# Patient Record
Sex: FEMALE | Race: BLACK OR AFRICAN AMERICAN | NOT HISPANIC OR LATINO
[De-identification: names, ages, dates, MRNs, and addresses within clinical notes are randomized per-mention and may not be internally consistent; named-entity substitution may affect disease eponyms.]

---

## 2017-01-11 ENCOUNTER — RECORD ABSTRACTING (OUTPATIENT)
Age: 52
End: 2017-01-11

## 2017-01-11 DIAGNOSIS — Z78.9 OTHER SPECIFIED HEALTH STATUS: ICD-10-CM

## 2017-01-11 DIAGNOSIS — Z86.018 PERSONAL HISTORY OF OTHER BENIGN NEOPLASM: ICD-10-CM

## 2017-01-11 DIAGNOSIS — Z82.3 FAMILY HISTORY OF STROKE: ICD-10-CM

## 2017-01-11 DIAGNOSIS — Z86.2 PERSONAL HISTORY OF DISEASES OF THE BLOOD AND BLOOD-FORMING ORGANS AND CERTAIN DISORDERS INVOLVING THE IMMUNE MECHANISM: ICD-10-CM

## 2017-01-26 ENCOUNTER — APPOINTMENT (OUTPATIENT)
Dept: OBGYN | Facility: CLINIC | Age: 52
End: 2017-01-26

## 2017-02-24 ENCOUNTER — RECORD ABSTRACTING (OUTPATIENT)
Age: 52
End: 2017-02-24

## 2017-02-24 DIAGNOSIS — Z98.891 HISTORY OF UTERINE SCAR FROM PREVIOUS SURGERY: ICD-10-CM

## 2017-02-24 DIAGNOSIS — Z87.59 PERSONAL HISTORY OF OTHER COMPLICATIONS OF PREGNANCY, CHILDBIRTH AND THE PUERPERIUM: ICD-10-CM

## 2017-02-24 DIAGNOSIS — Z33.2 ENCOUNTER FOR ELECTIVE TERMINATION OF PREGNANCY: ICD-10-CM

## 2022-08-10 ENCOUNTER — APPOINTMENT (OUTPATIENT)
Dept: OPHTHALMOLOGY | Facility: CLINIC | Age: 57
End: 2022-08-10

## 2022-08-10 ENCOUNTER — OUTPATIENT (OUTPATIENT)
Dept: OUTPATIENT SERVICES | Facility: HOSPITAL | Age: 57
LOS: 1 days | Discharge: HOME | End: 2022-08-10

## 2022-08-10 PROCEDURE — 92134 CPTRZ OPH DX IMG PST SGM RTA: CPT | Mod: 26

## 2022-08-10 PROCEDURE — 92004 COMPRE OPH EXAM NEW PT 1/>: CPT

## 2022-08-11 DIAGNOSIS — H25.813 COMBINED FORMS OF AGE-RELATED CATARACT, BILATERAL: ICD-10-CM

## 2022-08-11 DIAGNOSIS — E11.3313 TYPE 2 DIABETES MELLITUS WITH MODERATE NONPROLIFERATIVE DIABETIC RETINOPATHY WITH MACULAR EDEMA, BILATERAL: ICD-10-CM

## 2022-08-30 ENCOUNTER — OUTPATIENT (OUTPATIENT)
Dept: OUTPATIENT SERVICES | Facility: HOSPITAL | Age: 57
LOS: 1 days | Discharge: HOME | End: 2022-08-30

## 2022-08-30 ENCOUNTER — APPOINTMENT (OUTPATIENT)
Dept: OPHTHALMOLOGY | Facility: CLINIC | Age: 57
End: 2022-08-30

## 2022-08-30 PROCEDURE — 92134 CPTRZ OPH DX IMG PST SGM RTA: CPT | Mod: 26

## 2022-08-30 PROCEDURE — 92014 COMPRE OPH EXAM EST PT 1/>: CPT

## 2022-10-11 ENCOUNTER — OUTPATIENT (OUTPATIENT)
Dept: OUTPATIENT SERVICES | Facility: HOSPITAL | Age: 57
LOS: 1 days | Discharge: HOME | End: 2022-10-11

## 2022-10-11 ENCOUNTER — APPOINTMENT (OUTPATIENT)
Dept: OPHTHALMOLOGY | Facility: CLINIC | Age: 57
End: 2022-10-11

## 2022-10-11 PROCEDURE — 92136 OPHTHALMIC BIOMETRY: CPT | Mod: 26

## 2022-10-12 ENCOUNTER — APPOINTMENT (OUTPATIENT)
Dept: ENDOCRINOLOGY | Facility: CLINIC | Age: 57
End: 2022-10-12

## 2022-10-12 ENCOUNTER — OUTPATIENT (OUTPATIENT)
Dept: OUTPATIENT SERVICES | Facility: HOSPITAL | Age: 57
LOS: 1 days | Discharge: HOME | End: 2022-10-12

## 2022-10-12 VITALS
HEART RATE: 72 BPM | BODY MASS INDEX: 38.38 KG/M2 | SYSTOLIC BLOOD PRESSURE: 150 MMHG | HEIGHT: 66 IN | WEIGHT: 238.8 LBS | DIASTOLIC BLOOD PRESSURE: 77 MMHG

## 2022-10-12 PROCEDURE — 99203 OFFICE O/P NEW LOW 30 MIN: CPT | Mod: GC

## 2022-10-12 NOTE — ASSESSMENT
[Diabetes Foot Care] : diabetes foot care [Carbohydrate Consistent Diet] : carbohydrate consistent diet [Importance of Diet and Exercise] : importance of diet and exercise to improve glycemic control, achieve weight loss and improve cardiovascular health [Exercise/Effect on Glucose] : exercise/effect on glucose [Weight Loss] : weight loss [FreeTextEntry1] : 58 yo F with PMHx DM referred from opthalmology  office today for DM screening. Pt reports she has been diagnosed with DM 13 years ago and was taking metformin until the start of covid pandemic but have not been taking any meds since then. Denies any new complaints\par \par #DM\par - Blood work: F/u a1c, CMP, lipid profile, CBC, TSH, b12 in next visit\par \par - scheduled for cataract surgery on 11/14 and need clearance

## 2022-10-12 NOTE — HISTORY OF PRESENT ILLNESS
[FreeTextEntry1] : 56 yo F with PMHx DM referred from ophthalmology  office today for DM screening. Pt reports she has been diagnosed with DM 13 years ago and was taking metformin until the start of covid pandemic but have not been taking any meds since then. Denies any new complaints

## 2022-10-12 NOTE — END OF VISIT
[] : Resident [FreeTextEntry3] : 57 year old female with known type 2 DM , on on metformin until 2-3 years ago when she stopped completely ( COVID pandemic ) . was seen at eye clinic and need to have cataract surgery done. \par no recent blood work done and need to do labs now and based on results will restart metformin or add another meds \par discussed diet and exercise  [Time Spent: ___ minutes] : I have spent [unfilled] minutes of time on the encounter.

## 2022-10-12 NOTE — PHYSICAL EXAM
[Alert] : alert [Well Nourished] : well nourished [No Acute Distress] : no acute distress [Well Developed] : well developed [No Proptosis] : no proptosis [Normal Oropharynx] : the oropharynx was normal [Thyroid Not Enlarged] : the thyroid was not enlarged [No Thyroid Nodules] : no palpable thyroid nodules [No Respiratory Distress] : no respiratory distress [No Accessory Muscle Use] : no accessory muscle use [Clear to Auscultation] : lungs were clear to auscultation bilaterally [Normal S1, S2] : normal S1 and S2 [Normal Rate] : heart rate was normal [Regular Rhythm] : with a regular rhythm [No Edema] : no peripheral edema [Pedal Pulses Normal] : the pedal pulses are present [Normal Bowel Sounds] : normal bowel sounds [Not Tender] : non-tender [Not Distended] : not distended [Soft] : abdomen soft [Normal Anterior Cervical Nodes] : no anterior cervical lymphadenopathy [Normal Posterior Cervical Nodes] : no posterior cervical lymphadenopathy [No Spinal Tenderness] : no spinal tenderness [Spine Straight] : spine straight [No Stigmata of Cushings Syndrome] : no stigmata of Cushings Syndrome [Normal Gait] : normal gait [Normal Strength/Tone] : muscle strength and tone were normal [No Tremors] : no tremors [Oriented x3] : oriented to person, place, and time [Obese] : obese [No Lid Lag] : no lid lag [Acanthosis Nigricans] : no acanthosis nigricans

## 2022-10-12 NOTE — REASON FOR VISIT
[Initial Evaluation] : an initial evaluation [DM Type 2] : DM Type 2 [FreeTextEntry2] : opthalmology

## 2022-10-13 ENCOUNTER — OUTPATIENT (OUTPATIENT)
Dept: OUTPATIENT SERVICES | Facility: HOSPITAL | Age: 57
LOS: 1 days | Discharge: HOME | End: 2022-10-13

## 2022-10-13 ENCOUNTER — APPOINTMENT (OUTPATIENT)
Dept: INTERNAL MEDICINE | Facility: CLINIC | Age: 57
End: 2022-10-13

## 2022-10-13 VITALS
BODY MASS INDEX: 38.09 KG/M2 | OXYGEN SATURATION: 99 % | SYSTOLIC BLOOD PRESSURE: 163 MMHG | WEIGHT: 237 LBS | HEIGHT: 66 IN | HEART RATE: 66 BPM | DIASTOLIC BLOOD PRESSURE: 96 MMHG | TEMPERATURE: 88.1 F

## 2022-10-13 PROCEDURE — 99203 OFFICE O/P NEW LOW 30 MIN: CPT | Mod: GC

## 2022-10-13 NOTE — ASSESSMENT
[FreeTextEntry1] : 57F with PMHx of HTN, DM, Obesity presents for initial evaluation. \par \par Diabetes;\par Low sugar, low carbohydrate diet \par Exercise Counseled \par Patient given opportunity to discuss frequency and target blood sugar levels\par Patient educated on symptoms of hypo/hyperglycemic events \par Counseled on: Yearly Ophthalmology and Podiatry Exam\par seeing endo\par f/u baseline labs\par \par HTN;\par DASH diet discussed and recommended\par Exercise and weight loss counseled \par Frequency and target at home BP readings discussed\par Treatment options and possible side effects discussed\par Patient counseled on symptoms of hypo/hypertension\par Counseled: Yearly Ophthalmology exams\par -Pt will obtain ambulatory BP readings and bring them in next visit before starting medications\par -BP in clinic 150/77, next clinic visit 163/96\par -F/u routine labs then start ACEI/ARB if BMP okay\par \par Obesity;\par Diet/Exercise Counseled\par Patient was counseled on changing their diet to low fat, low carbohydrates and low cholesterol.\par Patient was also counseled on increasing their activity to 45 minutes of exercise daily.\par \par #HCM\par - Covid vaccinated x2 , considering booster at clinic\par - Never had colonoscopy -> Sent GI referral\par - Mammogram script sent, never had mammogram\par - Last PAP smear 2016, sent GYN referral\par - Routine blood work pending\par - RTC in 3 months or PRN\par \par Seen by resident Nafisa on 10/13/2022 as well

## 2022-10-13 NOTE — REVIEW OF SYSTEMS
[Vision Problems] : vision problems [Fever] : no fever [Chills] : no chills [Nasal Discharge] : no nasal discharge [Sore Throat] : no sore throat [Chest Pain] : no chest pain [Palpitations] : no palpitations [Lower Ext Edema] : no lower extremity edema [Orthopnea] : no orthopnea [Shortness Of Breath] : no shortness of breath [Wheezing] : no wheezing [Cough] : no cough [Dyspnea on Exertion] : no dyspnea on exertion [Abdominal Pain] : no abdominal pain [Nausea] : no nausea [Constipation] : no constipation [Diarrhea] : diarrhea [Vomiting] : no vomiting [Heartburn] : no heartburn [Joint Pain] : no joint pain [Joint Stiffness] : no joint stiffness [Muscle Pain] : no muscle pain [Skin Rash] : no skin rash [Headache] : no headache [Insomnia] : no insomnia

## 2022-10-13 NOTE — PHYSICAL EXAM
[No Acute Distress] : no acute distress [Well Nourished] : well nourished [Well Developed] : well developed [EOMI] : extraocular movements intact [Normal Oropharynx] : the oropharynx was normal [Supple] : supple [No Respiratory Distress] : no respiratory distress  [No Accessory Muscle Use] : no accessory muscle use [Clear to Auscultation] : lungs were clear to auscultation bilaterally [Normal Rate] : normal rate  [Regular Rhythm] : with a regular rhythm [Normal S1, S2] : normal S1 and S2 [No Murmur] : no murmur heard [No Edema] : there was no peripheral edema [Soft] : abdomen soft [Non Tender] : non-tender [Non-distended] : non-distended [No Spinal Tenderness] : no spinal tenderness [Grossly Normal Strength/Tone] : grossly normal strength/tone [Normal Gait] : normal gait [Normal Affect] : the affect was normal

## 2022-10-13 NOTE — HISTORY OF PRESENT ILLNESS
[FreeTextEntry1] : Establish care - no complaints [de-identified] : 57F with PMHx of HTN, DM, and obesity presents to the clinic to establish care. Patient has no complaints today. She has not seen a doctor in a few years since before the start of the covid pandemic. She saw endocrine yesterday and had routine blood work ordered, pending today. Has an upcoming cataract surgery, here for clearance too. \par \par Never had colonoscopy. Covid vaccinated x2. Last PAP 2016, no mammo.

## 2022-10-14 LAB
ALBUMIN SERPL ELPH-MCNC: 4 G/DL
ALP BLD-CCNC: 152 U/L
ALT SERPL-CCNC: 11 U/L
ANION GAP SERPL CALC-SCNC: 11 MMOL/L
AST SERPL-CCNC: 11 U/L
BASOPHILS # BLD AUTO: 0.03 K/UL
BASOPHILS NFR BLD AUTO: 0.5 %
BILIRUB SERPL-MCNC: 0.7 MG/DL
BUN SERPL-MCNC: 15 MG/DL
CALCIUM SERPL-MCNC: 9.1 MG/DL
CHLORIDE SERPL-SCNC: 103 MMOL/L
CHOLEST SERPL-MCNC: 208 MG/DL
CO2 SERPL-SCNC: 27 MMOL/L
CREAT SERPL-MCNC: 1 MG/DL
CREAT SPEC-SCNC: 79 MG/DL
EGFR: 67 ML/MIN/1.73M2
EOSINOPHIL # BLD AUTO: 0.27 K/UL
EOSINOPHIL NFR BLD AUTO: 4.9 %
ESTIMATED AVERAGE GLUCOSE: 292 MG/DL
GLUCOSE SERPL-MCNC: 249 MG/DL
HBA1C MFR BLD HPLC: 11.8 %
HCT VFR BLD CALC: 35.3 %
HDLC SERPL-MCNC: 44 MG/DL
HGB BLD-MCNC: 11.3 G/DL
IMM GRANULOCYTES NFR BLD AUTO: 0.2 %
LDLC SERPL CALC-MCNC: 147 MG/DL
LYMPHOCYTES # BLD AUTO: 2.14 K/UL
LYMPHOCYTES NFR BLD AUTO: 39.2 %
MAN DIFF?: NORMAL
MCHC RBC-ENTMCNC: 26.2 PG
MCHC RBC-ENTMCNC: 32 G/DL
MCV RBC AUTO: 81.9 FL
MICROALBUMIN 24H UR DL<=1MG/L-MCNC: 3 MG/DL
MICROALBUMIN/CREAT 24H UR-RTO: 38 MG/G
MONOCYTES # BLD AUTO: 0.55 K/UL
MONOCYTES NFR BLD AUTO: 10.1 %
NEUTROPHILS # BLD AUTO: 2.46 K/UL
NEUTROPHILS NFR BLD AUTO: 45.1 %
NONHDLC SERPL-MCNC: 164 MG/DL
PLATELET # BLD AUTO: 261 K/UL
POTASSIUM SERPL-SCNC: 4.6 MMOL/L
PROT SERPL-MCNC: 7.3 G/DL
RBC # BLD: 4.31 M/UL
RBC # FLD: 14.2 %
SODIUM SERPL-SCNC: 141 MMOL/L
TRIGL SERPL-MCNC: 85 MG/DL
TSH SERPL-ACNC: 2.08 UIU/ML
VIT B12 SERPL-MCNC: 734 PG/ML
WBC # FLD AUTO: 5.46 K/UL

## 2022-10-17 DIAGNOSIS — I10 ESSENTIAL (PRIMARY) HYPERTENSION: ICD-10-CM

## 2022-10-17 DIAGNOSIS — Z00.00 ENCOUNTER FOR GENERAL ADULT MEDICAL EXAMINATION WITHOUT ABNORMAL FINDINGS: ICD-10-CM

## 2022-10-17 DIAGNOSIS — E11.9 TYPE 2 DIABETES MELLITUS WITHOUT COMPLICATIONS: ICD-10-CM

## 2022-10-17 DIAGNOSIS — E66.9 OBESITY, UNSPECIFIED: ICD-10-CM

## 2022-11-02 ENCOUNTER — INPATIENT (INPATIENT)
Facility: HOSPITAL | Age: 57
LOS: 0 days | Discharge: HOME | End: 2022-11-03
Attending: INTERNAL MEDICINE | Admitting: INTERNAL MEDICINE

## 2022-11-02 VITALS
HEART RATE: 78 BPM | TEMPERATURE: 99 F | SYSTOLIC BLOOD PRESSURE: 214 MMHG | RESPIRATION RATE: 18 BRPM | OXYGEN SATURATION: 99 % | DIASTOLIC BLOOD PRESSURE: 101 MMHG

## 2022-11-02 LAB
ALBUMIN SERPL ELPH-MCNC: 4.1 G/DL — SIGNIFICANT CHANGE UP (ref 3.5–5.2)
ALBUMIN SERPL ELPH-MCNC: 4.3 G/DL — SIGNIFICANT CHANGE UP (ref 3.5–5.2)
ALP SERPL-CCNC: 114 U/L — SIGNIFICANT CHANGE UP (ref 30–115)
ALP SERPL-CCNC: 134 U/L — HIGH (ref 30–115)
ALT FLD-CCNC: 13 U/L — SIGNIFICANT CHANGE UP (ref 0–41)
ALT FLD-CCNC: 14 U/L — SIGNIFICANT CHANGE UP (ref 0–41)
ANION GAP SERPL CALC-SCNC: 12 MMOL/L — SIGNIFICANT CHANGE UP (ref 7–14)
ANION GAP SERPL CALC-SCNC: 14 MMOL/L — SIGNIFICANT CHANGE UP (ref 7–14)
APPEARANCE UR: CLEAR — SIGNIFICANT CHANGE UP
AST SERPL-CCNC: 24 U/L — SIGNIFICANT CHANGE UP (ref 0–41)
AST SERPL-CCNC: 42 U/L — HIGH (ref 0–41)
BASOPHILS # BLD AUTO: 0.03 K/UL — SIGNIFICANT CHANGE UP (ref 0–0.2)
BASOPHILS NFR BLD AUTO: 0.4 % — SIGNIFICANT CHANGE UP (ref 0–1)
BILIRUB SERPL-MCNC: 0.6 MG/DL — SIGNIFICANT CHANGE UP (ref 0.2–1.2)
BILIRUB SERPL-MCNC: 0.6 MG/DL — SIGNIFICANT CHANGE UP (ref 0.2–1.2)
BILIRUB UR-MCNC: NEGATIVE — SIGNIFICANT CHANGE UP
BUN SERPL-MCNC: 14 MG/DL — SIGNIFICANT CHANGE UP (ref 10–20)
BUN SERPL-MCNC: 14 MG/DL — SIGNIFICANT CHANGE UP (ref 10–20)
CALCIUM SERPL-MCNC: 8.9 MG/DL — SIGNIFICANT CHANGE UP (ref 8.4–10.4)
CALCIUM SERPL-MCNC: 9 MG/DL — SIGNIFICANT CHANGE UP (ref 8.4–10.5)
CHLORIDE SERPL-SCNC: 100 MMOL/L — SIGNIFICANT CHANGE UP (ref 98–110)
CHLORIDE SERPL-SCNC: 99 MMOL/L — SIGNIFICANT CHANGE UP (ref 98–110)
CO2 SERPL-SCNC: 22 MMOL/L — SIGNIFICANT CHANGE UP (ref 17–32)
CO2 SERPL-SCNC: 24 MMOL/L — SIGNIFICANT CHANGE UP (ref 17–32)
COLOR SPEC: SIGNIFICANT CHANGE UP
CREAT SERPL-MCNC: 1.1 MG/DL — SIGNIFICANT CHANGE UP (ref 0.7–1.5)
CREAT SERPL-MCNC: 1.1 MG/DL — SIGNIFICANT CHANGE UP (ref 0.7–1.5)
DIFF PNL FLD: NEGATIVE — SIGNIFICANT CHANGE UP
EGFR: 59 ML/MIN/1.73M2 — LOW
EGFR: 59 ML/MIN/1.73M2 — LOW
EOSINOPHIL # BLD AUTO: 0.04 K/UL — SIGNIFICANT CHANGE UP (ref 0–0.7)
EOSINOPHIL NFR BLD AUTO: 0.5 % — SIGNIFICANT CHANGE UP (ref 0–8)
GLUCOSE SERPL-MCNC: 173 MG/DL — HIGH (ref 70–99)
GLUCOSE SERPL-MCNC: 265 MG/DL — HIGH (ref 70–99)
GLUCOSE UR QL: ABNORMAL
HCT VFR BLD CALC: 33.2 % — LOW (ref 37–47)
HGB BLD-MCNC: 11 G/DL — LOW (ref 12–16)
IMM GRANULOCYTES NFR BLD AUTO: 0.3 % — SIGNIFICANT CHANGE UP (ref 0.1–0.3)
KETONES UR-MCNC: SIGNIFICANT CHANGE UP
LEUKOCYTE ESTERASE UR-ACNC: ABNORMAL
LYMPHOCYTES # BLD AUTO: 1.53 K/UL — SIGNIFICANT CHANGE UP (ref 1.2–3.4)
LYMPHOCYTES # BLD AUTO: 20 % — LOW (ref 20.5–51.1)
MCHC RBC-ENTMCNC: 26.6 PG — LOW (ref 27–31)
MCHC RBC-ENTMCNC: 33.1 G/DL — SIGNIFICANT CHANGE UP (ref 32–37)
MCV RBC AUTO: 80.4 FL — LOW (ref 81–99)
MONOCYTES # BLD AUTO: 0.48 K/UL — SIGNIFICANT CHANGE UP (ref 0.1–0.6)
MONOCYTES NFR BLD AUTO: 6.3 % — SIGNIFICANT CHANGE UP (ref 1.7–9.3)
NEUTROPHILS # BLD AUTO: 5.56 K/UL — SIGNIFICANT CHANGE UP (ref 1.4–6.5)
NEUTROPHILS NFR BLD AUTO: 72.5 % — SIGNIFICANT CHANGE UP (ref 42.2–75.2)
NITRITE UR-MCNC: NEGATIVE — SIGNIFICANT CHANGE UP
NRBC # BLD: 0 /100 WBCS — SIGNIFICANT CHANGE UP (ref 0–0)
NT-PROBNP SERPL-SCNC: 39 PG/ML — SIGNIFICANT CHANGE UP (ref 0–300)
PH UR: 8 — SIGNIFICANT CHANGE UP (ref 5–8)
PLATELET # BLD AUTO: 294 K/UL — SIGNIFICANT CHANGE UP (ref 130–400)
POTASSIUM SERPL-MCNC: 5.5 MMOL/L — HIGH (ref 3.5–5)
POTASSIUM SERPL-MCNC: SIGNIFICANT CHANGE UP MMOL/L (ref 3.5–5)
POTASSIUM SERPL-SCNC: 5.5 MMOL/L — HIGH (ref 3.5–5)
POTASSIUM SERPL-SCNC: SIGNIFICANT CHANGE UP MMOL/L (ref 3.5–5)
PROT SERPL-MCNC: 7.9 G/DL — SIGNIFICANT CHANGE UP (ref 6–8)
PROT SERPL-MCNC: 7.9 G/DL — SIGNIFICANT CHANGE UP (ref 6–8)
PROT UR-MCNC: SIGNIFICANT CHANGE UP
RBC # BLD: 4.13 M/UL — LOW (ref 4.2–5.4)
RBC # FLD: 14.2 % — SIGNIFICANT CHANGE UP (ref 11.5–14.5)
SARS-COV-2 RNA SPEC QL NAA+PROBE: SIGNIFICANT CHANGE UP
SODIUM SERPL-SCNC: 134 MMOL/L — LOW (ref 135–146)
SODIUM SERPL-SCNC: 137 MMOL/L — SIGNIFICANT CHANGE UP (ref 135–146)
SP GR SPEC: 1.03 — HIGH (ref 1.01–1.03)
TROPONIN T SERPL-MCNC: <0.01 NG/ML — SIGNIFICANT CHANGE UP
UROBILINOGEN FLD QL: SIGNIFICANT CHANGE UP
WBC # BLD: 7.66 K/UL — SIGNIFICANT CHANGE UP (ref 4.8–10.8)
WBC # FLD AUTO: 7.66 K/UL — SIGNIFICANT CHANGE UP (ref 4.8–10.8)

## 2022-11-02 PROCEDURE — 70498 CT ANGIOGRAPHY NECK: CPT | Mod: 26,MA

## 2022-11-02 PROCEDURE — 70496 CT ANGIOGRAPHY HEAD: CPT | Mod: 26,MA

## 2022-11-02 PROCEDURE — 93010 ELECTROCARDIOGRAM REPORT: CPT

## 2022-11-02 PROCEDURE — 71045 X-RAY EXAM CHEST 1 VIEW: CPT | Mod: 26

## 2022-11-02 PROCEDURE — 99285 EMERGENCY DEPT VISIT HI MDM: CPT

## 2022-11-02 RX ORDER — ACETAMINOPHEN 500 MG
975 TABLET ORAL ONCE
Refills: 0 | Status: COMPLETED | OUTPATIENT
Start: 2022-11-02 | End: 2022-11-03

## 2022-11-02 RX ORDER — KETOROLAC TROMETHAMINE 30 MG/ML
30 SYRINGE (ML) INJECTION ONCE
Refills: 0 | Status: DISCONTINUED | OUTPATIENT
Start: 2022-11-02 | End: 2022-11-02

## 2022-11-02 RX ORDER — METOCLOPRAMIDE HCL 10 MG
10 TABLET ORAL ONCE
Refills: 0 | Status: COMPLETED | OUTPATIENT
Start: 2022-11-02 | End: 2022-11-02

## 2022-11-02 RX ADMIN — Medication 104 MILLIGRAM(S): at 20:00

## 2022-11-02 RX ADMIN — Medication 30 MILLIGRAM(S): at 22:30

## 2022-11-02 NOTE — ED ADULT NURSE REASSESSMENT NOTE - NS ED NURSE REASSESS COMMENT FT1
Patient met in room at start of shift, presenting with unilateral headache and eye pain. Patient reports nausea related to headache and moderate weakness. Patient assisted to restroom. Needs assessed and met at this time. Updated on plan of care and verbalized understanding.

## 2022-11-02 NOTE — ED ADULT TRIAGE NOTE - CHIEF COMPLAINT QUOTE
pt c/o right sided ocular HA suddenly today. + dizziness and nausea. hasn't taken antihypertensive medications in a couple days

## 2022-11-03 ENCOUNTER — NON-APPOINTMENT (OUTPATIENT)
Age: 57
End: 2022-11-03

## 2022-11-03 ENCOUNTER — TRANSCRIPTION ENCOUNTER (OUTPATIENT)
Age: 57
End: 2022-11-03

## 2022-11-03 VITALS
SYSTOLIC BLOOD PRESSURE: 126 MMHG | HEART RATE: 71 BPM | OXYGEN SATURATION: 98 % | DIASTOLIC BLOOD PRESSURE: 59 MMHG | TEMPERATURE: 97 F | RESPIRATION RATE: 18 BRPM

## 2022-11-03 LAB
A1C WITH ESTIMATED AVERAGE GLUCOSE RESULT: 9.1 % — HIGH (ref 4–5.6)
ALBUMIN SERPL ELPH-MCNC: 3.9 G/DL — SIGNIFICANT CHANGE UP (ref 3.5–5.2)
ALP SERPL-CCNC: 121 U/L — HIGH (ref 30–115)
ALT FLD-CCNC: 11 U/L — SIGNIFICANT CHANGE UP (ref 0–41)
ANION GAP SERPL CALC-SCNC: 11 MMOL/L — SIGNIFICANT CHANGE UP (ref 7–14)
AST SERPL-CCNC: 15 U/L — SIGNIFICANT CHANGE UP (ref 0–41)
BACTERIA # UR AUTO: ABNORMAL
BASOPHILS # BLD AUTO: 0.02 K/UL — SIGNIFICANT CHANGE UP (ref 0–0.2)
BASOPHILS NFR BLD AUTO: 0.2 % — SIGNIFICANT CHANGE UP (ref 0–1)
BILIRUB SERPL-MCNC: 0.6 MG/DL — SIGNIFICANT CHANGE UP (ref 0.2–1.2)
BUN SERPL-MCNC: 18 MG/DL — SIGNIFICANT CHANGE UP (ref 10–20)
CALCIUM SERPL-MCNC: 8.7 MG/DL — SIGNIFICANT CHANGE UP (ref 8.4–10.5)
CHLORIDE SERPL-SCNC: 102 MMOL/L — SIGNIFICANT CHANGE UP (ref 98–110)
CO2 SERPL-SCNC: 24 MMOL/L — SIGNIFICANT CHANGE UP (ref 17–32)
CREAT SERPL-MCNC: 1.3 MG/DL — SIGNIFICANT CHANGE UP (ref 0.7–1.5)
EGFR: 49 ML/MIN/1.73M2 — LOW
EOSINOPHIL # BLD AUTO: 0.05 K/UL — SIGNIFICANT CHANGE UP (ref 0–0.7)
EOSINOPHIL NFR BLD AUTO: 0.6 % — SIGNIFICANT CHANGE UP (ref 0–8)
EPI CELLS # UR: 6 /HPF — HIGH (ref 0–5)
ESTIMATED AVERAGE GLUCOSE: 214 MG/DL — HIGH (ref 68–114)
GLUCOSE BLDC GLUCOMTR-MCNC: 156 MG/DL — HIGH (ref 70–99)
GLUCOSE BLDC GLUCOMTR-MCNC: 183 MG/DL — HIGH (ref 70–99)
GLUCOSE SERPL-MCNC: 154 MG/DL — HIGH (ref 70–99)
HCT VFR BLD CALC: 31.5 % — LOW (ref 37–47)
HGB BLD-MCNC: 10.7 G/DL — LOW (ref 12–16)
HYALINE CASTS # UR AUTO: 0 /LPF — SIGNIFICANT CHANGE UP (ref 0–7)
IMM GRANULOCYTES NFR BLD AUTO: 0.2 % — SIGNIFICANT CHANGE UP (ref 0.1–0.3)
LYMPHOCYTES # BLD AUTO: 2.69 K/UL — SIGNIFICANT CHANGE UP (ref 1.2–3.4)
LYMPHOCYTES # BLD AUTO: 29.7 % — SIGNIFICANT CHANGE UP (ref 20.5–51.1)
MAGNESIUM SERPL-MCNC: 1.7 MG/DL — LOW (ref 1.8–2.4)
MCHC RBC-ENTMCNC: 26.8 PG — LOW (ref 27–31)
MCHC RBC-ENTMCNC: 34 G/DL — SIGNIFICANT CHANGE UP (ref 32–37)
MCV RBC AUTO: 78.8 FL — LOW (ref 81–99)
MONOCYTES # BLD AUTO: 0.97 K/UL — HIGH (ref 0.1–0.6)
MONOCYTES NFR BLD AUTO: 10.7 % — HIGH (ref 1.7–9.3)
NEUTROPHILS # BLD AUTO: 5.31 K/UL — SIGNIFICANT CHANGE UP (ref 1.4–6.5)
NEUTROPHILS NFR BLD AUTO: 58.6 % — SIGNIFICANT CHANGE UP (ref 42.2–75.2)
NRBC # BLD: 0 /100 WBCS — SIGNIFICANT CHANGE UP (ref 0–0)
PLATELET # BLD AUTO: 301 K/UL — SIGNIFICANT CHANGE UP (ref 130–400)
POTASSIUM SERPL-MCNC: 3.7 MMOL/L — SIGNIFICANT CHANGE UP (ref 3.5–5)
POTASSIUM SERPL-SCNC: 3.7 MMOL/L — SIGNIFICANT CHANGE UP (ref 3.5–5)
PROT SERPL-MCNC: 6.8 G/DL — SIGNIFICANT CHANGE UP (ref 6–8)
RBC # BLD: 4 M/UL — LOW (ref 4.2–5.4)
RBC # FLD: 14.4 % — SIGNIFICANT CHANGE UP (ref 11.5–14.5)
RBC CASTS # UR COMP ASSIST: 4 /HPF — SIGNIFICANT CHANGE UP (ref 0–4)
SODIUM SERPL-SCNC: 137 MMOL/L — SIGNIFICANT CHANGE UP (ref 135–146)
WBC # BLD: 9.06 K/UL — SIGNIFICANT CHANGE UP (ref 4.8–10.8)
WBC # FLD AUTO: 9.06 K/UL — SIGNIFICANT CHANGE UP (ref 4.8–10.8)
WBC UR QL: 17 /HPF — HIGH (ref 0–5)

## 2022-11-03 PROCEDURE — 99223 1ST HOSP IP/OBS HIGH 75: CPT

## 2022-11-03 RX ORDER — SODIUM CHLORIDE 9 MG/ML
1000 INJECTION, SOLUTION INTRAVENOUS
Refills: 0 | Status: DISCONTINUED | OUTPATIENT
Start: 2022-11-03 | End: 2022-11-03

## 2022-11-03 RX ORDER — ACETAMINOPHEN 500 MG
650 TABLET ORAL EVERY 6 HOURS
Refills: 0 | Status: DISCONTINUED | OUTPATIENT
Start: 2022-11-03 | End: 2022-11-03

## 2022-11-03 RX ORDER — INSULIN LISPRO 100/ML
2 VIAL (ML) SUBCUTANEOUS
Refills: 0 | Status: DISCONTINUED | OUTPATIENT
Start: 2022-11-03 | End: 2022-11-03

## 2022-11-03 RX ORDER — AMLODIPINE BESYLATE 2.5 MG/1
10 TABLET ORAL DAILY
Refills: 0 | Status: DISCONTINUED | OUTPATIENT
Start: 2022-11-03 | End: 2022-11-03

## 2022-11-03 RX ORDER — MAGNESIUM SULFATE 500 MG/ML
2 VIAL (ML) INJECTION ONCE
Refills: 0 | Status: COMPLETED | OUTPATIENT
Start: 2022-11-03 | End: 2022-11-03

## 2022-11-03 RX ORDER — DEXTROSE 50 % IN WATER 50 %
12.5 SYRINGE (ML) INTRAVENOUS ONCE
Refills: 0 | Status: DISCONTINUED | OUTPATIENT
Start: 2022-11-03 | End: 2022-11-03

## 2022-11-03 RX ORDER — AMLODIPINE BESYLATE 2.5 MG/1
1 TABLET ORAL
Qty: 30 | Refills: 0
Start: 2022-11-03 | End: 2022-12-02

## 2022-11-03 RX ORDER — LISINOPRIL 2.5 MG/1
1 TABLET ORAL
Qty: 0 | Refills: 0 | DISCHARGE

## 2022-11-03 RX ORDER — DEXTROSE 50 % IN WATER 50 %
25 SYRINGE (ML) INTRAVENOUS ONCE
Refills: 0 | Status: DISCONTINUED | OUTPATIENT
Start: 2022-11-03 | End: 2022-11-03

## 2022-11-03 RX ORDER — HEPARIN SODIUM 5000 [USP'U]/ML
5000 INJECTION INTRAVENOUS; SUBCUTANEOUS EVERY 8 HOURS
Refills: 0 | Status: DISCONTINUED | OUTPATIENT
Start: 2022-11-03 | End: 2022-11-03

## 2022-11-03 RX ORDER — INSULIN GLARGINE 100 [IU]/ML
6 INJECTION, SOLUTION SUBCUTANEOUS AT BEDTIME
Refills: 0 | Status: DISCONTINUED | OUTPATIENT
Start: 2022-11-03 | End: 2022-11-03

## 2022-11-03 RX ORDER — GLUCAGON INJECTION, SOLUTION 0.5 MG/.1ML
1 INJECTION, SOLUTION SUBCUTANEOUS ONCE
Refills: 0 | Status: DISCONTINUED | OUTPATIENT
Start: 2022-11-03 | End: 2022-11-03

## 2022-11-03 RX ORDER — INSULIN LISPRO 100/ML
VIAL (ML) SUBCUTANEOUS
Refills: 0 | Status: DISCONTINUED | OUTPATIENT
Start: 2022-11-03 | End: 2022-11-03

## 2022-11-03 RX ORDER — DEXTROSE 50 % IN WATER 50 %
15 SYRINGE (ML) INTRAVENOUS ONCE
Refills: 0 | Status: DISCONTINUED | OUTPATIENT
Start: 2022-11-03 | End: 2022-11-03

## 2022-11-03 RX ADMIN — AMLODIPINE BESYLATE 10 MILLIGRAM(S): 2.5 TABLET ORAL at 02:45

## 2022-11-03 RX ADMIN — Medication 25 GRAM(S): at 12:13

## 2022-11-03 RX ADMIN — Medication 1: at 12:19

## 2022-11-03 RX ADMIN — Medication 2 UNIT(S): at 12:11

## 2022-11-03 RX ADMIN — Medication 2 UNIT(S): at 08:26

## 2022-11-03 RX ADMIN — Medication 1: at 08:27

## 2022-11-03 RX ADMIN — Medication 975 MILLIGRAM(S): at 01:45

## 2022-11-03 NOTE — DISCHARGE NOTE PROVIDER - NSDCCPCAREPLAN_GEN_ALL_CORE_FT
PRINCIPAL DISCHARGE DIAGNOSIS  Diagnosis: Hypertensive urgency  Assessment and Plan of Treatment: When you came in your blood pressure was elevated. Hypertension forces your heart to work harder to pump blood. Your arteries may become narrow or stiff. Having untreated or uncontrolled hypertension for a long period of time can cause heart attack, stroke, kidney disease, and other problems. If started on a medication, take exactly as prescribed by your health care professional. Maintain a healthy lifestyle and follow up with your primary care physician.  SEEK IMMEDIATE MEDICAL CARE IF YOU HAVE ANY OF THE FOLLOWING SYMPTOMS: severe headache, confusion, chest pain, abdominal pain, vomiting, or shortness of breath.  PLEASE FOLLOW UP WITH YOUR PCP AND OPHTHALMOLOGY WITHIN 1-2 WEEKS

## 2022-11-03 NOTE — ED PROVIDER NOTE - OBJECTIVE STATEMENT
57 year old female with newly diagnosed DM and HTN presents to the ED with elevated BP and headache. Patient reports acute onset of headache located behind her right eye radiating throughout the right side of her head since 4 PM today.  She admits to bilateral blurred vision and lightheadedness.  She states that this is not the worst headache of her life however her pain is 10 out of 10 severe.  She states that she was given a medication to use as needed for her elevated blood pressure however she cannot member the name but does not take it every day.  Denies fever, chills, chest pain, shortness of breath, abdominal pain, nausea, vomiting, diarrhea, leg swelling, sore throat, nasal congestion, neck pain, back pain.  Denies facial droop, slurred speech, weakness/paresthesias.

## 2022-11-03 NOTE — DISCHARGE NOTE NURSING/CASE MANAGEMENT/SOCIAL WORK - PATIENT PORTAL LINK FT
You can access the FollowMyHealth Patient Portal offered by [facility] by registering at the following website: [email or web address]. By joining [employer]’s FollowMyHealth portal, you will also be able to view your health information using other applications (apps) compatible with our system.

## 2022-11-03 NOTE — DISCHARGE NOTE PROVIDER - NSDCFUSCHEDAPPT_GEN_ALL_CORE_FT
[facility]  OPHTHALM  [address]  Scheduled Appointment: 11/08/2022    [facility]  [facility] PreAdmits  Scheduled Appointment: 11/14/2022

## 2022-11-03 NOTE — DISCHARGE NOTE NURSING/CASE MANAGEMENT/SOCIAL WORK - NSDCPEFALRISK_GEN_ALL_CORE
For information on Fall & Injury Prevention, visit: [email or web address] OR  [email or web address] OR  https://www.cdc.gov/steadi/patient.html

## 2022-11-03 NOTE — ED PROVIDER NOTE - CLINICAL SUMMARY MEDICAL DECISION MAKING FREE TEXT BOX
Patient evaluated for headache in setting of hypertension, labs and imaging reviewed, patient admitted for further monitoring and evaluation

## 2022-11-03 NOTE — ED PROVIDER NOTE - NS ED ROS FT
Constitutional: (-) fever  Eyes/ENT: (+) blurry vision, (-) epistaxis  Cardiovascular: (-) chest pain, (-) syncope  Respiratory: (-) cough, (-) shortness of breath  Gastrointestinal: (-) vomiting, (-) diarrhea  Musculoskeletal: (-) neck pain, (-) back pain, (-) joint pain  Integumentary: (-) rash, (-) edema  Neurological: (+) headache, (-) altered mental status (-) weakness/paresthesia  Psychiatric: (-) hallucinations  Allergic/Immunologic: (-) pruritus

## 2022-11-03 NOTE — H&P ADULT - HISTORY OF PRESENT ILLNESS
57 year old female with newly diagnosed DM and HTN presents to the ED with elevated BP and headache. Patient reports acute onset of headache located behind her right eye radiating throughout the right side of her head since 4 PM today.  She admits to bilateral blurred vision and lightheadedness.  She states that this is not the worst headache of her life however her pain is 10 out of 10 severe.  She states that she was given a medication to use as needed for her elevated blood pressure however she cannot member the name but does not take it every day.  Denies fever, chills, chest pain, shortness of breath, abdominal pain, nausea, vomiting, diarrhea, leg swelling, sore throat, nasal congestion, neck pain, back pain.  Denies facial droop, slurred speech, weakness/paresthesias.    In the ED, pt was given tylenol and ketorolac for headache.  57 year old female with newly diagnosed DM, HTN and DLD presents to the ED with elevated BP and headache. Patient reports acute onset of headache located behind her right eye radiating throughout the right side of her head since 4 PM today.  She admits to bilateral blurred vision and lightheadedness.  She states that this is not the worst headache of her life however her pain is 10 out of 10 severe.  She states that she was given a medication to use as needed for her elevated blood pressure however she cannot member the name but does not take it every day.  Denies fever, chills, chest pain, shortness of breath, abdominal pain, nausea, vomiting, diarrhea, leg swelling, sore throat, nasal congestion, neck pain, back pain.  Denies facial droop, slurred speech, weakness/paresthesias.    In the ED, pt was given tylenol and ketorolac for headache.

## 2022-11-03 NOTE — ED PROVIDER NOTE - QRS
Met with pt today briefly after RT  She is doing well with fluid intake - taking at least 80oz water most days  She would like to discuss eating during treatment with her  later this week  Will meet with pt &  after treatment on Thursday, 12/19  D/w Merissa @ [facility] & cancer counselor  
1.93
70

## 2022-11-03 NOTE — H&P ADULT - ASSESSMENT
57 year old female with newly diagnosed DM and HTN presents to the ED with elevated BP and headache. Patient reports acute onset of headache located behind her right eye radiating throughout the right side of her head since 4 PM today.  She admits to bilateral blurred vision and lightheadedness.  She states that this is not the worst headache of her life however her pain is 10 out of 10 severe.  She states that she was given a medication to use as needed for her elevated blood pressure however she cannot member the name but does not take it every day.     #Hypertensive Urgency   BP on adm 214/101  Improved to 185/87 without intervention   CT HEAD:No acute intracranial pathology. No evidence of midline shift, mass effect or intracranial hemorrhage.Nonspecific periventricular and subcortical white matter hypodensity likely reflecting mild chronic microvascular type changes.  CTA HEAD:No evidence of flow-limiting stenosis, occlusion or aneurysm.  CTA NECK:No evidence of carotid or vertebral artery stenosis.  No evidence of end organ damage  Blurry vision and headache on adm  s/p tylenol and ketorolac in the ED  will need daily BP meds on discharge, started on amlodipine inpt   f/u opthalmology consult     #Newly diagnosed DM  counselled pt on medication compliance  Keep FS <  180    #Asymptomatic bacteriuria  Monitor off abx  No sepsis POA     Misc  Diet- CC  Hep subq for DVT PPX      57 year old female with newly diagnosed DM,  HTN and DLD presents to the ED with elevated BP and headache. Patient reports acute onset of headache located behind her right eye radiating throughout the right side of her head since 4 PM today.  She admits to bilateral blurred vision and lightheadedness.  She states that this is not the worst headache of her life however her pain is 10 out of 10 severe.  She states that she was given a medication to use as needed for her elevated blood pressure however she cannot member the name but does not take it every day.     #Hypertensive Urgency- resolved   BP on adm 214/101  Improved to 185/87 without intervention, currently 146/64   CT HEAD:No acute intracranial pathology. No evidence of midline shift, mass effect or intracranial hemorrhage.Nonspecific periventricular and subcortical white matter hypodensity likely reflecting mild chronic microvascular type changes.  CTA HEAD:No evidence of flow-limiting stenosis, occlusion or aneurysm.  CTA NECK:No evidence of carotid or vertebral artery stenosis.  No evidence of end organ damage  Blurry vision and headache on adm, currently resolved  s/p tylenol and ketorolac in the ED  pt is non compliant with BP medication and cannot recall the name of the medication- pt's  is coming in the AM with medication list  will need daily BP meds on discharge, started on amlodipine inpt   f/u opthalmology consult     #Newly diagnosed DM  counselled pt on medication compliance  on Metformin at home  Keep FS <  180    #DLD  counselled pt on medication compliance  pt takes medication but cannot recall the name and is non compliant with the medication because it caused her dizziness   pt's  is coming in the AM with the medication list    #Asymptomatic bacteriuria  Monitor off abx  No sepsis POA     Misc  Diet- CC  Hep subq for DVT PPX

## 2022-11-03 NOTE — H&P ADULT - NSHPLABSRESULTS_GEN_ALL_CORE
11.0   7.66  )-----------( 294      ( 2022 19:34 )             33.2           137  |  99  |  14  ----------------------------<  265<H>  5.5<H>   |  24  |  1.1    Ca    9.0      2022 23:05    TPro  7.9  /  Alb  4.3  /  TBili  0.6  /  DBili  x   /  AST  24  /  ALT  14  /  AlkPhos  134<H>  1102              Urinalysis Basic - ( 2022 23:03 )    Color: Light Yellow / Appearance: Clear / S.035 / pH: x  Gluc: x / Ketone: Trace  / Bili: Negative / Urobili: <2 mg/dL   Blood: x / Protein: Trace / Nitrite: Negative   Leuk Esterase: Moderate / RBC: 4 /HPF / WBC 17 /HPF   Sq Epi: x / Non Sq Epi: 6 /HPF / Bacteria: Many            Lactate Trend      CARDIAC MARKERS ( 2022 19:34 )  x     / <0.01 ng/mL / x     / x     / x            CAPILLARY BLOOD GLUCOSE

## 2022-11-03 NOTE — ED PROVIDER NOTE - PROGRESS NOTE DETAILS
Patients BP reduced without antihypertensive intervention. Headache improved however remains severe and patient does not feel as though she can be discharged. Admit for elevated BP and headache.

## 2022-11-03 NOTE — DISCHARGE NOTE PROVIDER - CARE PROVIDER_API CALL
Handy Davila)  Ophthalmology  [address] [facility], [address] [place] [address]  Phone: [phone]  Fax: [phone]  Follow Up Time:     Luis Acuña ()  Internal Medicine  [address] [place] [address], Admin - [address]  [place] [address]  Phone: [phone]  Fax: [phone]  Follow Up Time:

## 2022-11-03 NOTE — H&P ADULT - ATTENDING COMMENTS
Pt seen and examined in the ED. History confirmed with the pt and as above. She feels well now - denies headache, dizziness, visual changes, SOB, chest pain, N/V, abdominal pain. ROS negative  resting comfortably now  stopped taking her antihypertensives because she was feeling dizzy.     T(F): 96.7 (11-03-22 @ 07:57), Max: 99 (11-02-22 @ 16:54)  HR: 65 (11-03-22 @ 11:47) (65 - 94)  BP: 112/65 (11-03-22 @ 11:47) (112/65 - 214/101)  RR: 18 (11-03-22 @ 11:47) (18 - 20)  SpO2: 98% (11-03-22 @ 11:47) (98% - 100%) on RA    I&O's Summary    CAPILLARY BLOOD GLUCOSE      POCT Blood Glucose.: 156 mg/dL (03 Nov 2022 11:43)  POCT Blood Glucose.: 183 mg/dL (03 Nov 2022 08:09)        PHYSICAL EXAM:  GENERAL: NAD  HEAD:  Normocephalic  EYES:  conjunctiva and sclera clear  ENMT: Moist mucous membranes  NERVOUS SYSTEM:  Alert, awake, Good concentration  CHEST/LUNG: CTA b/l  HEART: Regular rate and rhythm; No murmurs  ABDOMEN: Soft, Nontender, Nondistended; Bowel sounds present  EXTREMITIES:   No edema  SKIN: warm, dry    Consultant(s) Notes Reviewed:  [x ] YES  [ ] NO  Care Discussed with Consultants/Other Providers [ x] YES  [ ] NO    LABS:                        10.7   9.06  )-----------( 301      ( 03 Nov 2022 04:30 )             31.5     11-03    137  |  102  |  18  ----------------------------<  154<H>  3.7   |  24  |  1.3    Ca    8.7      03 Nov 2022 04:30  Mg     1.7     11-03    TPro  6.8  /  Alb  3.9  /  TBili  0.6  /  DBili  x   /  AST  15  /  ALT  11  /  AlkPhos  121<H>  11-03      RADIOLOGY & ADDITIONAL TESTS:  Imaging report Personally Reviewed:  [x ] YES  [ ] NO    Case discussed with resident  Care discussed with pt    58 y/o woman with PMH of HTN and newly diagnosed DM type 2 and hyperlipidemia presented with right sided headache, blurred vision and lightheadedness. Pt stopped taking her antihypertensives several days ago because she did not like the way she felt.    1. Hypertensive urgency  h/o HTN and stopped taking meds x several days (EMR reviewed and she was on lisinopril 5mg daily)  BP on presentation: 214/101  pt given amlodipine 10mg PO x 1 and now BP is 112/65  PO hydration and repeat BP -> if still dropping, then monitor overnight and may need to start IVF  amlodipine 5mg daily on discharge   low sodium diet  outpt f/u with PMD (saw Dr. Acuña at [facility] recently)  pt counseled to inform doctor if she is having adverse reaction to medication  outpt f/u with ophthamology - visual changes now resolved and likely due to uncontrolled HTN    2. DM type 2   recently seen by Endocrine  on metformin and glimepiride    3. Hyperlipidemia on Crestor    4. DVT prophylaxis

## 2022-11-03 NOTE — DISCHARGE NOTE PROVIDER - NSDCMRMEDTOKEN_GEN_ALL_CORE_FT
amLODIPine 5 mg oral tablet: 1 tab(s) orally once a day   amLODIPine 5 mg oral tablet: 1 tab(s) orally once a day  Crestor 10 mg oral tablet: 1 tab(s) orally once a day  glimepiride 1 mg oral tablet: 1 tab(s) orally once a day  metFORMIN 1000 mg oral tablet: 1 tab(s) orally 2 times a day

## 2022-11-03 NOTE — ED PROVIDER NOTE - NS ED ATTENDING STATEMENT MOD
This was a shared visit with the ERYN. I reviewed and verified the documentation and independently performed the documented:

## 2022-11-03 NOTE — DISCHARGE NOTE PROVIDER - HOSPITAL COURSE
ED COURSE:    57 year old female with newly diagnosed DM, HTN and DLD presents to the ED with elevated BP and headache. Patient reports acute onset of headache located behind her right eye radiating throughout the right side of her head since 4 PM today.  She admits to bilateral blurred vision and lightheadedness.  She states that this is not the worst headache of her life however her pain is 10 out of 10 severe.  She states that she was given a medication to use as needed for her elevated blood pressure however she cannot member the name but does not take it every day.  Denies fever, chills, chest pain, shortness of breath, abdominal pain, nausea, vomiting, diarrhea, leg swelling, sore throat, nasal congestion, neck pain, back pain.  Denies facial droop, slurred speech, weakness/paresthesias.    In the ED, pt was given tylenol and ketorolac for headache.       Hospital Course:    Pt admitted for Hypertensive Urgency. BP on adm 214/101. Improved to 185/87 without intervention, currently-----  In ED   CT HEAD:No acute intracranial pathology. No evidence of midline shift, mass effect or intracranial hemorrhage. Nonspecific periventricular and subcortical white matter hypodensity likely reflecting mild chronic microvascular type changes.  CTA HEAD:No evidence of flow-limiting stenosis, occlusion or aneurysm.  CTA NECK:No evidence of carotid or vertebral artery stenosis.  No evidence of end organ damage    Blurry vision and headache on adm, currently resolved.    Ophthalmology was consulted, stated patient is known to have blurry vision 2/2 cataracts and she is supposed to be scheduled for procedure soon.      Pt can be d/c to home with amlodopine for BP control with follow up within 1 week with PCP and Ophthalmology.        ED COURSE:    57 year old female with newly diagnosed DM, HTN and DLD presents to the ED with elevated BP and headache. Patient reports acute onset of headache located behind her right eye radiating throughout the right side of her head since 4 PM today.  She admits to bilateral blurred vision and lightheadedness.  She states that this is not the worst headache of her life however her pain is 10 out of 10 severe.  She states that she was given a medication to use as needed for her elevated blood pressure however she cannot member the name but does not take it every day.  Denies fever, chills, chest pain, shortness of breath, abdominal pain, nausea, vomiting, diarrhea, leg swelling, sore throat, nasal congestion, neck pain, back pain.  Denies facial droop, slurred speech, weakness/paresthesias.    In the ED, pt was given tylenol and ketorolac for headache.       Hospital Course:    Pt admitted for Hypertensive Urgency. BP on adm 214/101. Improved to 185/87 without intervention, currently 112/65.  In ED   CT HEAD:No acute intracranial pathology. No evidence of midline shift, mass effect or intracranial hemorrhage. Nonspecific periventricular and subcortical white matter hypodensity likely reflecting mild chronic microvascular type changes.  CTA HEAD:No evidence of flow-limiting stenosis, occlusion or aneurysm.  CTA NECK:No evidence of carotid or vertebral artery stenosis.  No evidence of end organ damage    Blurry vision and headache on adm, currently resolved.    Ophthalmology was consulted, stated patient is known to have blurry vision 2/2 cataracts and she is supposed to be scheduled for procedure soon.      Pt can be d/c to home with amlodopine for BP control with follow up within 1 week with PCP and Ophthalmology.

## 2022-11-03 NOTE — ED PROVIDER NOTE - PHYSICAL EXAMINATION
Physical Exam    Constitutional: No acute distress. Poor eye contact, minimal participation in exam.   Eyes: Conjunctiva pink, Sclera clear, PERRLA, EOMI.  ENT: No sinus tenderness. No nasal discharge. No oropharyngeal erythema, edema, or exudates. Uvula midline.   Cardiovascular: Regular rate, regular rhythm. No noted murmurs rubs or gallops.  Respiratory: unlabored respiratory effort, clear to auscultation bilaterally no wheezing, rales or rhonchi  Gastrointestinal: Normal bowel sounds. soft, non distended, non-tender abdomen.   Musculoskeletal: supple neck, no midline tenderness. No joint or bony deformity.   Integumentary: warm, dry, no rash  Neurologic: awake, alert, cranial nerves II-XII grossly intact, extremities’ motor and sensory functions grossly intact. No meningeal signs.

## 2022-11-03 NOTE — ED PROVIDER NOTE - ATTENDING APP SHARED VISIT CONTRIBUTION OF CARE
57 year old female with PMH DM, HTN presents for evaluation of headache located posterior to right eye rating to right head that started earlier on 4 PM.  Patient reports she took her blood pressure was very high so came to ED.  Reports feeling lightheaded and having episode of blurred vision.  Headache was gradual, not described as worst headache of her life.  Has been taking her blood pressure medication intermittently as of late.  Patient denies any CP, SOB, paresthesias, focal weakness, nausea, vomiting diarrhea or abdominal pain.  No fevers, chills.  Denies any falls or trauma.    VITAL SIGNS: noted  CONSTITUTIONAL: Well-developed; well-nourished; in no acute distress  HEAD: Normocephalic; atraumatic  EYES: PERRL, EOM intact; conjunctiva and sclera clear  ENT: No nasal discharge; airway clear. MMM  NECK: Supple; non tender. No anterior cervical lymphadenopathy noted  CARD: S1, S2 normal; no murmurs, gallops, or rubs. Regular rate and rhythm  RESP: CTAB/L, no wheezes, rales or rhonchi  ABD: Normal bowel sounds; soft; non-distended; non-tender; no CVA tenderness  EXT: Normal ROM. No calf tenderness or edema. Distal pulses intact  NEURO: AAO x 3, normal speech, no facial asymmetry, negative pronator drift, no ataxia, negative Romberg, no dysdiadokinesia, no nystagmus, sensory equal and intact.   SKIN: Skin exam is warm and dry, no acute rash  MS: No midline spinal tenderness

## 2022-11-05 LAB
CULTURE RESULTS: SIGNIFICANT CHANGE UP
SPECIMEN SOURCE: SIGNIFICANT CHANGE UP

## 2022-11-08 ENCOUNTER — OUTPATIENT (OUTPATIENT)
Dept: OUTPATIENT SERVICES | Facility: HOSPITAL | Age: 57
LOS: 1 days | Discharge: HOME | End: 2022-11-08

## 2022-11-08 ENCOUNTER — APPOINTMENT (OUTPATIENT)
Dept: OPHTHALMOLOGY | Facility: CLINIC | Age: 57
End: 2022-11-08
Payer: SUBSIDIZED

## 2022-11-08 PROCEDURE — 92014 COMPRE OPH EXAM EST PT 1/>: CPT

## 2022-11-10 DIAGNOSIS — I10 ESSENTIAL (PRIMARY) HYPERTENSION: ICD-10-CM

## 2022-11-10 DIAGNOSIS — E11.9 TYPE 2 DIABETES MELLITUS WITHOUT COMPLICATIONS: ICD-10-CM

## 2022-11-10 DIAGNOSIS — Z91.14 PATIENT'S OTHER NONCOMPLIANCE WITH MEDICATION REGIMEN: ICD-10-CM

## 2022-11-10 DIAGNOSIS — R51.9 HEADACHE, UNSPECIFIED: ICD-10-CM

## 2022-11-10 DIAGNOSIS — T46.5X6A UNDERDOSING OF OTHER ANTIHYPERTENSIVE DRUGS, INITIAL ENCOUNTER: ICD-10-CM

## 2022-11-10 DIAGNOSIS — R82.71 BACTERIURIA: ICD-10-CM

## 2022-11-10 DIAGNOSIS — I16.0 HYPERTENSIVE URGENCY: ICD-10-CM

## 2022-11-10 DIAGNOSIS — Z79.84 LONG TERM (CURRENT) USE OF ORAL HYPOGLYCEMIC DRUGS: ICD-10-CM

## 2022-11-10 DIAGNOSIS — E78.5 HYPERLIPIDEMIA, UNSPECIFIED: ICD-10-CM
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
## 2022-11-10 DIAGNOSIS — H26.9 UNSPECIFIED CATARACT: ICD-10-CM

## 2022-11-10 DIAGNOSIS — Z91.128 PATIENT'S INTENTIONAL UNDERDOSING OF MEDICATION REGIMEN FOR OTHER REASON: ICD-10-CM

## 2022-12-16 NOTE — DISCHARGE NOTE PROVIDER - PROVIDER TOKENS
How Severe Is Your Psoriasis?: moderate Is This A New Presentation, Or A Follow-Up?: Follow Up Psoriasis Additional History: Patient did not  prescription PROVIDER:[TOKEN:[[id number]]],PROVIDER:[TOKEN:[[id number]]]

## 2022-12-19 ENCOUNTER — APPOINTMENT (OUTPATIENT)
Dept: INTERNAL MEDICINE | Facility: CLINIC | Age: 57
End: 2022-12-19
Payer: SUBSIDIZED

## 2022-12-19 ENCOUNTER — OUTPATIENT (OUTPATIENT)
Dept: OUTPATIENT SERVICES | Facility: HOSPITAL | Age: 57
LOS: 1 days | Discharge: HOME | End: 2022-12-19

## 2022-12-19 VITALS
SYSTOLIC BLOOD PRESSURE: 151 MMHG | BODY MASS INDEX: 38.25 KG/M2 | DIASTOLIC BLOOD PRESSURE: 75 MMHG | TEMPERATURE: 95.9 F | HEART RATE: 75 BPM | OXYGEN SATURATION: 100 % | HEIGHT: 66 IN | WEIGHT: 238 LBS

## 2022-12-19 VITALS — SYSTOLIC BLOOD PRESSURE: 144 MMHG | DIASTOLIC BLOOD PRESSURE: 80 MMHG

## 2022-12-19 PROCEDURE — 99213 OFFICE O/P EST LOW 20 MIN: CPT | Mod: GC

## 2022-12-19 RX ORDER — LISINOPRIL 2.5 MG/1
2.5 TABLET ORAL
Refills: 0 | Status: COMPLETED | COMMUNITY
End: 2022-12-19

## 2022-12-19 RX ORDER — GLIMEPIRIDE 4 MG/1
4 TABLET ORAL
Refills: 0 | Status: COMPLETED | COMMUNITY
End: 2022-12-19

## 2022-12-19 RX ORDER — ROSUVASTATIN CALCIUM 10 MG/1
10 TABLET, FILM COATED ORAL
Qty: 90 | Refills: 1 | Status: COMPLETED | COMMUNITY
Start: 2022-10-14 | End: 2022-12-19

## 2022-12-19 RX ORDER — LISINOPRIL 5 MG/1
5 TABLET ORAL
Qty: 90 | Refills: 3 | Status: COMPLETED | COMMUNITY
Start: 2022-10-14 | End: 2022-12-19

## 2022-12-19 NOTE — HISTORY OF PRESENT ILLNESS
[FreeTextEntry1] : follow up, hospital f/u hypertensive emergency [de-identified] : 57F with PMHx of HTN, DM, and obesity presents to the clinic to establish care. Patient has no complaints today. She was admitted to the hospital on 11/3/22 and discharged the following day for hypertensive urgency/emergency which resolved. Discharged on amlodipine 5 mg for BP control. She reports compliance with medications. No alcohol, tobacco, and drug \par \par Never had colonoscopy. Covid vaccinated x2. Last PAP 2016, no mammo.

## 2022-12-19 NOTE — PLAN
[FreeTextEntry1] : 57F with PMHx of HTN, DM, Obesity presents for initial evaluation. \par \par DiabetesMellitus Type 2, poorly controlled\par - A1c- 11.8% Oct 2022 > 9.1% in November 2022\par - c/w metformin 500 mg BID \par - c/w glimepiride 2 mg daily in am for now \par - c/w crestor 10 mg daily >changed to lipitor due to cost of medications \par -  stopped lisinopril 5 mg in hospital due to dizzyness, started on amlodipine 10 mg \par -  following with Endocrinology \par - counseled on Low sugar, low carbohydrate diet, and healthy lifestyle \par - counseled on: Yearly Ophthalmology and Podiatry Exam\par \par HTN- poorly controlled \par DASH diet discussed and recommended\par Exercise and weight loss counseled \par Frequency and target at home BP readings discussed\par Treatment options and possible side effects discussed\par Patient counseled on symptoms of hypo/hypertension\par Counseled: Yearly Ophthalmology exams\par -Pt will obtain ambulatory BP readings and bring them in next visit before starting medications\par -BP in clinic 151/75 on amlodipine 5mg > increase to 10 mg \par \par Obesity;\par Diet/Exercise Counseled\par Patient was counseled on changing their diet to low fat, low carbohydrates and low cholesterol.\par Patient was also counseled on increasing their activity to 45 minutes of exercise daily.\par \par Rash\par around eyes, likely contact dermatitis secondary to make up removal wipes\par check LUCIANA, lupus screen\par #HCM\par - Covid vaccinated x2 , considering booster at clinic\par - Never had colonoscopy -> Sent GI referral\par Colon Cancer Screening;\par Patient counseled on the importance of colonoscopy screening and directed to follow-up with GI doctor. Failure to perform test can result in Colon Cancer and Death.\par \par - Mammogram script sent, never had mammogram\par Mammogram Cancer Screening;\par Patient counseled on the importance of a yearly mammogram screening and directed to have test performed or follow-up with OB/GYN. Failure to perform test can result in breast cancer and death\par \par - Last PAP smear 2016, sent GYN referral\par - Routine blood work pending\par - RTC in 3 months or PRN\par \par Patient also seen by medical resident\par note and plan edited as above

## 2022-12-19 NOTE — PHYSICAL EXAM
[No Acute Distress] : no acute distress [Well Nourished] : well nourished [Well Developed] : well developed [No JVD] : no jugular venous distention [No Respiratory Distress] : no respiratory distress  [No Accessory Muscle Use] : no accessory muscle use [Clear to Auscultation] : lungs were clear to auscultation bilaterally [Normal Rate] : normal rate  [Regular Rhythm] : with a regular rhythm [Normal S1, S2] : normal S1 and S2 [No Edema] : there was no peripheral edema [Soft] : abdomen soft [Non Tender] : non-tender [No HSM] : no HSM [Normal Bowel Sounds] : normal bowel sounds [No Rash] : no rash [Coordination Grossly Intact] : coordination grossly intact [Normal Gait] : normal gait [Normal Affect] : the affect was normal [Normal Insight/Judgement] : insight and judgment were intact [de-identified] : obese

## 2022-12-20 DIAGNOSIS — I10 ESSENTIAL (PRIMARY) HYPERTENSION: ICD-10-CM

## 2022-12-20 DIAGNOSIS — E66.9 OBESITY, UNSPECIFIED: ICD-10-CM

## 2022-12-20 DIAGNOSIS — Z00.00 ENCOUNTER FOR GENERAL ADULT MEDICAL EXAMINATION WITHOUT ABNORMAL FINDINGS: ICD-10-CM

## 2022-12-20 DIAGNOSIS — E11.9 TYPE 2 DIABETES MELLITUS WITHOUT COMPLICATIONS: ICD-10-CM

## 2023-02-21 ENCOUNTER — APPOINTMENT (OUTPATIENT)
Dept: OPHTHALMOLOGY | Facility: CLINIC | Age: 58
End: 2023-02-21
Payer: COMMERCIAL

## 2023-02-21 ENCOUNTER — OUTPATIENT (OUTPATIENT)
Dept: OUTPATIENT SERVICES | Facility: HOSPITAL | Age: 58
LOS: 1 days | End: 2023-02-21
Payer: COMMERCIAL

## 2023-02-21 ENCOUNTER — APPOINTMENT (OUTPATIENT)
Dept: OPHTHALMOLOGY | Facility: CLINIC | Age: 58
End: 2023-02-21

## 2023-02-21 DIAGNOSIS — H53.8 OTHER VISUAL DISTURBANCES: ICD-10-CM

## 2023-02-21 LAB
BASOPHILS # BLD AUTO: 0.02 K/UL
BASOPHILS NFR BLD AUTO: 0.3 %
EOSINOPHIL # BLD AUTO: 0.31 K/UL
EOSINOPHIL NFR BLD AUTO: 4.5 %
ESTIMATED AVERAGE GLUCOSE: 146 MG/DL
HBA1C MFR BLD HPLC: 6.7 %
HCT VFR BLD CALC: 34.1 %
HGB BLD-MCNC: 11.1 G/DL
IMM GRANULOCYTES NFR BLD AUTO: 0.3 %
LYMPHOCYTES # BLD AUTO: 1.97 K/UL
LYMPHOCYTES NFR BLD AUTO: 28.4 %
MAN DIFF?: NORMAL
MCHC RBC-ENTMCNC: 27.6 PG
MCHC RBC-ENTMCNC: 32.6 G/DL
MCV RBC AUTO: 84.8 FL
MONOCYTES # BLD AUTO: 0.56 K/UL
MONOCYTES NFR BLD AUTO: 8.1 %
NEUTROPHILS # BLD AUTO: 4.06 K/UL
NEUTROPHILS NFR BLD AUTO: 58.4 %
PLATELET # BLD AUTO: 292 K/UL
RBC # BLD: 4.02 M/UL
RBC # FLD: 13.5 %
WBC # FLD AUTO: 6.94 K/UL

## 2023-02-21 PROCEDURE — 92014 COMPRE OPH EXAM EST PT 1/>: CPT

## 2023-02-23 LAB
ALBUMIN SERPL ELPH-MCNC: 4.1 G/DL
ALP BLD-CCNC: 143 U/L
ALT SERPL-CCNC: 14 U/L
ANION GAP SERPL CALC-SCNC: 13 MMOL/L
AST SERPL-CCNC: 14 U/L
BILIRUB SERPL-MCNC: 0.4 MG/DL
BUN SERPL-MCNC: 19 MG/DL
CALCIUM SERPL-MCNC: 9.1 MG/DL
CHLORIDE SERPL-SCNC: 103 MMOL/L
CHOLEST SERPL-MCNC: 183 MG/DL
CO2 SERPL-SCNC: 25 MMOL/L
CREAT SERPL-MCNC: 1.1 MG/DL
CREAT SPEC-SCNC: 75 MG/DL
CREAT/PROT UR: 0.3 RATIO
EGFR: 59 ML/MIN/1.73M2
GLUCOSE SERPL-MCNC: 147 MG/DL
HDLC SERPL-MCNC: 47 MG/DL
LDLC SERPL CALC-MCNC: 118 MG/DL
NONHDLC SERPL-MCNC: 136 MG/DL
POTASSIUM SERPL-SCNC: 4.6 MMOL/L
PROT SERPL-MCNC: 7.5 G/DL
PROT UR-MCNC: 22 MG/DLG/24H
SODIUM SERPL-SCNC: 141 MMOL/L
TRIGL SERPL-MCNC: 91 MG/DL

## 2023-02-27 LAB — ANA SER IF-ACNC: NEGATIVE

## 2023-02-28 DIAGNOSIS — H25.22 AGE-RELATED CATARACT, MORGAGNIAN TYPE, LEFT EYE: ICD-10-CM

## 2023-02-28 DIAGNOSIS — H25.13 AGE-RELATED NUCLEAR CATARACT, BILATERAL: ICD-10-CM

## 2023-03-10 ENCOUNTER — NON-APPOINTMENT (OUTPATIENT)
Age: 58
End: 2023-03-10

## 2023-03-20 ENCOUNTER — APPOINTMENT (OUTPATIENT)
Dept: INTERNAL MEDICINE | Facility: CLINIC | Age: 58
End: 2023-03-20
Payer: COMMERCIAL

## 2023-03-20 ENCOUNTER — OUTPATIENT (OUTPATIENT)
Dept: OUTPATIENT SERVICES | Facility: HOSPITAL | Age: 58
LOS: 1 days | End: 2023-03-20
Payer: COMMERCIAL

## 2023-03-20 VITALS
HEART RATE: 73 BPM | BODY MASS INDEX: 38.09 KG/M2 | SYSTOLIC BLOOD PRESSURE: 149 MMHG | WEIGHT: 237 LBS | HEIGHT: 66 IN | TEMPERATURE: 96 F | DIASTOLIC BLOOD PRESSURE: 74 MMHG | OXYGEN SATURATION: 100 %

## 2023-03-20 DIAGNOSIS — Z00.00 ENCOUNTER FOR GENERAL ADULT MEDICAL EXAMINATION WITHOUT ABNORMAL FINDINGS: ICD-10-CM

## 2023-03-20 PROCEDURE — 99213 OFFICE O/P EST LOW 20 MIN: CPT | Mod: GC

## 2023-03-20 PROCEDURE — 99213 OFFICE O/P EST LOW 20 MIN: CPT

## 2023-03-20 RX ORDER — METFORMIN HYDROCHLORIDE 500 MG/1
500 TABLET, COATED ORAL
Qty: 360 | Refills: 3 | Status: ACTIVE | COMMUNITY
Start: 2022-10-14 | End: 1900-01-01

## 2023-03-20 RX ORDER — ATORVASTATIN CALCIUM 40 MG/1
40 TABLET, FILM COATED ORAL
Qty: 90 | Refills: 3 | Status: ACTIVE | COMMUNITY
Start: 2022-12-19 | End: 1900-01-01

## 2023-03-20 RX ORDER — LOSARTAN POTASSIUM 50 MG/1
50 TABLET, FILM COATED ORAL DAILY
Qty: 1 | Refills: 3 | Status: ACTIVE | COMMUNITY
Start: 2023-03-20 | End: 1900-01-01

## 2023-03-20 RX ORDER — AMLODIPINE BESYLATE 10 MG/1
10 TABLET ORAL
Qty: 90 | Refills: 3 | Status: ACTIVE | COMMUNITY
Start: 2022-12-07 | End: 1900-01-01

## 2023-03-20 NOTE — PLAN
[FreeTextEntry1] : 58F with PMHx of HTN, DM, Obesity presents for initial evaluation. \par \par #Preop for cataracts\par RCRI 6%, low risk procedure\par Pt can proceed with surgery for cataracts as planned \par paperwork filled out\par low risk for proposed surgery, may proceed with surgery without further cardiac workup\par \par Diabetes Mellitus Type 2, improving \par - A1c 6.7 from 11.8 \par - c/w metformin 1000mg BID \par - c/w glimepiride 2 mg daily \par - c/w lipitor 40, ldl 118\par -  stopped lisinopril 5 mg in hospital due to dizzyness, started on amlodipine 10 mg \par -start losartan 50 \par - counseled on Low sugar, low carbohydrate diet, and healthy lifestyle \par - counseled on: Yearly Ophthalmology and Podiatry Exam\par \par HTN- poorly controlled \par DASH diet discussed and recommended\par Exercise and weight loss counseled \par Frequency and target at home BP readings discussed\par Treatment options and possible side effects discussed\par Patient counseled on symptoms of hypo/hypertension\par Counseled: Yearly Ophthalmology exams\par -Pt will obtain ambulatory BP readings and bring them in next visit before starting medications\par -c/w amlodipine 10\par -start losartan 50 \par \par Obesity;\par Diet/Exercise Counseled\par Patient was counseled on changing their diet to low fat, low carbohydrates and low cholesterol.\par Patient was also counseled on increasing their activity to 45 minutes of exercise daily.\par \par #HCM\par - Covid vaccinated x2\par - reports colo in 2017? reportedly normal \par - Mammogram script sent, never had mammogram\par Mammogram Cancer Screening;\par Patient counseled on the importance of a yearly mammogram screening and directed to have test performed or follow-up with OB/GYN. Failure to perform test can result in breast cancer and death\par \par - Last PAP smear 2016, sent GYN referral\par - Routine blood work in 3 months \par - RTC in 3 months or PRN\par \par

## 2023-03-20 NOTE — PHYSICAL EXAM
[No Acute Distress] : no acute distress [Well Nourished] : well nourished [No Respiratory Distress] : no respiratory distress  [No Accessory Muscle Use] : no accessory muscle use [Normal Rate] : normal rate  [Regular Rhythm] : with a regular rhythm [Normal S1, S2] : normal S1 and S2 [No Edema] : there was no peripheral edema [Soft] : abdomen soft [Non Tender] : non-tender [Non-distended] : non-distended [No Focal Deficits] : no focal deficits [Normal Insight/Judgement] : insight and judgment were intact [de-identified] : cataracts , poor vision

## 2023-03-20 NOTE — HISTORY OF PRESENT ILLNESS
[FreeTextEntry1] : clearance for cataract sx [de-identified] : 58F with PMHx of HTN, DM, and obesity presents to the clinic to for f/u.\par \par Here for cataract clearance, no new complaints \par \par >4 met\par local sedation\par diabetes better\par not on blood thinners

## 2023-03-23 DIAGNOSIS — E66.9 OBESITY, UNSPECIFIED: ICD-10-CM

## 2023-03-23 DIAGNOSIS — E11.9 TYPE 2 DIABETES MELLITUS WITHOUT COMPLICATIONS: ICD-10-CM

## 2023-03-23 DIAGNOSIS — I10 ESSENTIAL (PRIMARY) HYPERTENSION: ICD-10-CM

## 2023-03-23 DIAGNOSIS — Z00.00 ENCOUNTER FOR GENERAL ADULT MEDICAL EXAMINATION WITHOUT ABNORMAL FINDINGS: ICD-10-CM

## 2023-03-29 ENCOUNTER — OUTPATIENT (OUTPATIENT)
Dept: INPATIENT UNIT | Facility: HOSPITAL | Age: 58
LOS: 1 days | Discharge: HOME | End: 2023-03-29
Payer: COMMERCIAL

## 2023-03-29 DIAGNOSIS — H25.042 POSTERIOR SUBCAPSULAR POLAR AGE-RELATED CATARACT, LEFT EYE: ICD-10-CM

## 2023-03-29 LAB — GLUCOSE BLDC GLUCOMTR-MCNC: 160 MG/DL — HIGH (ref 70–99)

## 2023-03-29 PROCEDURE — V2632: CPT

## 2023-03-29 PROCEDURE — 82962 GLUCOSE BLOOD TEST: CPT

## 2023-03-29 PROCEDURE — 66982 XCAPSL CTRC RMVL CPLX WO ECP: CPT | Mod: LT

## 2023-03-30 ENCOUNTER — APPOINTMENT (OUTPATIENT)
Dept: OPHTHALMOLOGY | Facility: CLINIC | Age: 58
End: 2023-03-30
Payer: COMMERCIAL

## 2023-03-30 ENCOUNTER — OUTPATIENT (OUTPATIENT)
Dept: OUTPATIENT SERVICES | Facility: HOSPITAL | Age: 58
LOS: 1 days | End: 2023-03-30
Payer: COMMERCIAL

## 2023-03-30 DIAGNOSIS — H25.22 AGE-RELATED CATARACT, MORGAGNIAN TYPE, LEFT EYE: ICD-10-CM

## 2023-03-30 DIAGNOSIS — H53.8 OTHER VISUAL DISTURBANCES: ICD-10-CM

## 2023-03-30 DIAGNOSIS — H25.13 AGE-RELATED NUCLEAR CATARACT, BILATERAL: ICD-10-CM

## 2023-03-30 PROCEDURE — 99024 POSTOP FOLLOW-UP VISIT: CPT

## 2023-03-30 PROCEDURE — 92012 INTRM OPH EXAM EST PATIENT: CPT

## 2023-04-04 DIAGNOSIS — Z79.84 LONG TERM (CURRENT) USE OF ORAL HYPOGLYCEMIC DRUGS: ICD-10-CM

## 2023-04-04 DIAGNOSIS — H21.562 PUPILLARY ABNORMALITY, LEFT EYE: ICD-10-CM

## 2023-04-04 DIAGNOSIS — E11.36 TYPE 2 DIABETES MELLITUS WITH DIABETIC CATARACT: ICD-10-CM

## 2023-04-04 DIAGNOSIS — I10 ESSENTIAL (PRIMARY) HYPERTENSION: ICD-10-CM

## 2023-04-19 ENCOUNTER — APPOINTMENT (OUTPATIENT)
Dept: OPHTHALMOLOGY | Facility: CLINIC | Age: 58
End: 2023-04-19
Payer: COMMERCIAL

## 2023-04-19 ENCOUNTER — OUTPATIENT (OUTPATIENT)
Dept: OUTPATIENT SERVICES | Facility: HOSPITAL | Age: 58
LOS: 1 days | End: 2023-04-19
Payer: COMMERCIAL

## 2023-04-19 DIAGNOSIS — H53.8 OTHER VISUAL DISTURBANCES: ICD-10-CM

## 2023-04-19 PROCEDURE — 92012 INTRM OPH EXAM EST PATIENT: CPT

## 2023-04-19 PROCEDURE — 99024 POSTOP FOLLOW-UP VISIT: CPT

## 2023-04-19 PROCEDURE — 92134 CPTRZ OPH DX IMG PST SGM RTA: CPT | Mod: 26

## 2023-04-19 PROCEDURE — 92134 CPTRZ OPH DX IMG PST SGM RTA: CPT

## 2023-04-26 DIAGNOSIS — H59.039: ICD-10-CM

## 2023-04-26 DIAGNOSIS — H25.13 AGE-RELATED NUCLEAR CATARACT, BILATERAL: ICD-10-CM

## 2023-04-26 DIAGNOSIS — H25.22 AGE-RELATED CATARACT, MORGAGNIAN TYPE, LEFT EYE: ICD-10-CM

## 2023-05-04 ENCOUNTER — APPOINTMENT (OUTPATIENT)
Dept: INTERNAL MEDICINE | Facility: CLINIC | Age: 58
End: 2023-05-04
Payer: COMMERCIAL

## 2023-05-04 ENCOUNTER — APPOINTMENT (OUTPATIENT)
Dept: NUTRITION | Facility: CLINIC | Age: 58
End: 2023-05-04

## 2023-05-04 ENCOUNTER — OUTPATIENT (OUTPATIENT)
Dept: OUTPATIENT SERVICES | Facility: HOSPITAL | Age: 58
LOS: 1 days | End: 2023-05-04
Payer: COMMERCIAL

## 2023-05-04 ENCOUNTER — NON-APPOINTMENT (OUTPATIENT)
Age: 58
End: 2023-05-04

## 2023-05-04 VITALS
WEIGHT: 247 LBS | HEART RATE: 78 BPM | DIASTOLIC BLOOD PRESSURE: 86 MMHG | SYSTOLIC BLOOD PRESSURE: 134 MMHG | HEIGHT: 66 IN | TEMPERATURE: 95.9 F | BODY MASS INDEX: 39.7 KG/M2 | OXYGEN SATURATION: 99 %

## 2023-05-04 DIAGNOSIS — E66.9 OBESITY, UNSPECIFIED: ICD-10-CM

## 2023-05-04 DIAGNOSIS — Z00.00 ENCOUNTER FOR GENERAL ADULT MEDICAL EXAMINATION WITHOUT ABNORMAL FINDINGS: ICD-10-CM

## 2023-05-04 DIAGNOSIS — Z00.00 ENCOUNTER FOR GENERAL ADULT MEDICAL EXAMINATION W/OUT ABNORMAL FINDINGS: ICD-10-CM

## 2023-05-04 DIAGNOSIS — I10 ESSENTIAL (PRIMARY) HYPERTENSION: ICD-10-CM

## 2023-05-04 DIAGNOSIS — E11.9 TYPE 2 DIABETES MELLITUS W/OUT COMPLICATIONS: ICD-10-CM

## 2023-05-04 PROCEDURE — 99213 OFFICE O/P EST LOW 20 MIN: CPT | Mod: GC

## 2023-05-04 PROCEDURE — 97802 MEDICAL NUTRITION INDIV IN: CPT

## 2023-05-04 PROCEDURE — 99213 OFFICE O/P EST LOW 20 MIN: CPT

## 2023-05-04 RX ORDER — GLIMEPIRIDE 2 MG/1
2 TABLET ORAL DAILY
Qty: 90 | Refills: 3 | Status: ACTIVE | COMMUNITY
Start: 2022-10-14 | End: 1900-01-01

## 2023-05-04 NOTE — REVIEW OF SYSTEMS
[Fever] : no fever [Chills] : no chills [Discharge] : no discharge [Pain] : no pain [Earache] : no earache [Chest Pain] : no chest pain [Palpitations] : no palpitations [Shortness Of Breath] : no shortness of breath [Wheezing] : no wheezing [Abdominal Pain] : no abdominal pain [Nausea] : no nausea [Dysuria] : no dysuria [Joint Pain] : no joint pain [Joint Stiffness] : no joint stiffness [Itching] : no itching [Headache] : no headache [Dizziness] : no dizziness [Insomnia] : no insomnia

## 2023-05-04 NOTE — PHYSICAL EXAM
[No Acute Distress] : no acute distress [Well Nourished] : well nourished [Normal Sclera/Conjunctiva] : normal sclera/conjunctiva [Normal Outer Ear/Nose] : the outer ears and nose were normal in appearance [No JVD] : no jugular venous distention [No Respiratory Distress] : no respiratory distress  [No Accessory Muscle Use] : no accessory muscle use [No Carotid Bruits] : no carotid bruits [Soft] : abdomen soft [Non Tender] : non-tender [No CVA Tenderness] : no CVA  tenderness [No Spinal Tenderness] : no spinal tenderness [No Joint Swelling] : no joint swelling [Grossly Normal Strength/Tone] : grossly normal strength/tone [No Rash] : no rash [No Focal Deficits] : no focal deficits [Normal Insight/Judgement] : insight and judgment were intact [de-identified] : overweight

## 2023-05-04 NOTE — ASSESSMENT
[FreeTextEntry1] : 58F with PMHx of HTN, DM, Obesity for f/u\par \par Diabetes Mellitus Type 2, improving \par - A1c 6.7 from 11.8 \par - c/w metformin 1000mg BID \par - c/w glimepiride 2 mg daily \par - c/w lipitor 40, ldl 118\par - stopped lisinopril 5 mg in hospital due to dizzyness, started on amlodipine 10 mg \par c/w losartan 50 \par - counseled on Low sugar, low carbohydrate diet, and healthy lifestyle \par - counseled on: Yearly Ophthalmology and Podiatry Exam\par \par # Preop for cataracts\par - RCRI 6%, low risk procedure\par - Pt had surgery for cataract on left but scheduled for the right\par - low risk for proposed surgery, may proceed with surgery without further cardiac workup\par \par \par # HTN- now well controlled\par DASH diet discussed and recommended\par Exercise and weight loss counseled \par Frequency and target at home BP readings discussed\par Treatment options and possible side effects discussed\par Patient counseled on symptoms of hypo/hypertension\par Counseled: Yearly Ophthalmology exams\par -Pt will obtain ambulatory BP readings and bring them in next visit before starting medications\par -c/w amlodipine 10,  losartan 50 \par \par # Obesity;\par Diet/Exercise Counseled\par Patient was counseled on changing their diet to low fat, low carbohydrates and low cholesterol.\par Patient was also counseled on increasing their activity to 45 minutes of exercise daily.\par \par # HCM\par - Covid vaccinated x2\par - reports colo in 4716-0226 range? patient reports non malignant polyp-> would likely be due for a repeat now \par Colon Cancer Screening;\par Patient counseled on the importance of colonoscopy screening and directed to follow-up with GI doctor. Failure to perform test can result in Colon Cancer and Death.\par \par - Mammogram script sent, never had mammogram\par Patient counseled on the importance of a yearly mammogram screening and directed to have test performed or follow-up with OB/GYN. Failure to perform test can result in breast cancer and death\par - Last PAP smear 2016, sent GYN referral, cervical cancer screening guidance provided\par

## 2023-05-04 NOTE — HISTORY OF PRESENT ILLNESS
[FreeTextEntry1] : follow up [de-identified] : 58 F with PMH of HTN, DM, and obesity, cataracts presents to the clinic to for f/u on recent labs. She has uncontrolled diabetes but compliant with medications and making dietary modifications and confesses that she is losing stephanie. Her BP is controlled with medications. She had cataract surgery recently and is scheduled for right eye in next few weeks.\par

## 2023-05-09 DIAGNOSIS — E66.9 OBESITY, UNSPECIFIED: ICD-10-CM

## 2023-05-15 DIAGNOSIS — Z00.00 ENCOUNTER FOR GENERAL ADULT MEDICAL EXAMINATION WITHOUT ABNORMAL FINDINGS: ICD-10-CM

## 2023-05-15 DIAGNOSIS — E66.9 OBESITY, UNSPECIFIED: ICD-10-CM

## 2023-05-15 DIAGNOSIS — E11.9 TYPE 2 DIABETES MELLITUS WITHOUT COMPLICATIONS: ICD-10-CM

## 2023-05-15 DIAGNOSIS — I10 ESSENTIAL (PRIMARY) HYPERTENSION: ICD-10-CM

## 2023-05-17 ENCOUNTER — APPOINTMENT (OUTPATIENT)
Dept: OPHTHALMOLOGY | Facility: CLINIC | Age: 58
End: 2023-05-17

## 2023-05-23 ENCOUNTER — OUTPATIENT (OUTPATIENT)
Dept: INPATIENT UNIT | Facility: HOSPITAL | Age: 58
LOS: 1 days | Discharge: HOME | End: 2023-05-23
Payer: COMMERCIAL

## 2023-05-23 VITALS
DIASTOLIC BLOOD PRESSURE: 67 MMHG | TEMPERATURE: 97 F | OXYGEN SATURATION: 100 % | HEIGHT: 67 IN | HEART RATE: 64 BPM | RESPIRATION RATE: 17 BRPM | SYSTOLIC BLOOD PRESSURE: 126 MMHG | WEIGHT: 240.97 LBS

## 2023-05-23 VITALS — SYSTOLIC BLOOD PRESSURE: 111 MMHG | DIASTOLIC BLOOD PRESSURE: 65 MMHG | RESPIRATION RATE: 18 BRPM | HEART RATE: 71 BPM

## 2023-05-23 DIAGNOSIS — H25.041 POSTERIOR SUBCAPSULAR POLAR AGE-RELATED CATARACT, RIGHT EYE: ICD-10-CM

## 2023-05-23 LAB — GLUCOSE BLDC GLUCOMTR-MCNC: 212 MG/DL — HIGH (ref 70–99)

## 2023-05-23 PROCEDURE — V2632: CPT

## 2023-05-23 PROCEDURE — 82962 GLUCOSE BLOOD TEST: CPT

## 2023-05-23 RX ORDER — ROSUVASTATIN CALCIUM 5 MG/1
1 TABLET ORAL
Qty: 0 | Refills: 0 | DISCHARGE

## 2023-05-23 RX ORDER — METFORMIN HYDROCHLORIDE 850 MG/1
1 TABLET ORAL
Qty: 0 | Refills: 0 | DISCHARGE

## 2023-05-23 RX ORDER — GLIMEPIRIDE 1 MG
1 TABLET ORAL
Qty: 0 | Refills: 0 | DISCHARGE

## 2023-05-23 NOTE — PACU DISCHARGE NOTE - COMMENTS
58 y o female S/P Right ECCE with IOL Implant, LSB/MAC without complications. VS /64 HR 63 RR 16 SaO2 100%. Pt tolerated procedure well.

## 2023-05-24 ENCOUNTER — APPOINTMENT (OUTPATIENT)
Dept: OPHTHALMOLOGY | Facility: CLINIC | Age: 58
End: 2023-05-24
Payer: COMMERCIAL

## 2023-05-24 ENCOUNTER — OUTPATIENT (OUTPATIENT)
Dept: OUTPATIENT SERVICES | Facility: HOSPITAL | Age: 58
LOS: 1 days | End: 2023-05-24
Payer: COMMERCIAL

## 2023-05-24 DIAGNOSIS — H25.89 OTHER AGE-RELATED CATARACT: ICD-10-CM

## 2023-05-24 PROCEDURE — 99024 POSTOP FOLLOW-UP VISIT: CPT

## 2023-05-26 DIAGNOSIS — Z79.84 LONG TERM (CURRENT) USE OF ORAL HYPOGLYCEMIC DRUGS: ICD-10-CM

## 2023-05-26 DIAGNOSIS — H26.8 OTHER SPECIFIED CATARACT: ICD-10-CM

## 2023-05-26 DIAGNOSIS — Z88.3 ALLERGY STATUS TO OTHER ANTI-INFECTIVE AGENTS: ICD-10-CM

## 2023-05-26 DIAGNOSIS — E66.9 OBESITY, UNSPECIFIED: ICD-10-CM

## 2023-05-26 DIAGNOSIS — I10 ESSENTIAL (PRIMARY) HYPERTENSION: ICD-10-CM

## 2023-05-26 DIAGNOSIS — E11.36 TYPE 2 DIABETES MELLITUS WITH DIABETIC CATARACT: ICD-10-CM

## 2023-05-26 DIAGNOSIS — Z98.42 CATARACT EXTRACTION STATUS, LEFT EYE: ICD-10-CM

## 2023-05-26 DIAGNOSIS — Z96.1 PRESENCE OF INTRAOCULAR LENS: ICD-10-CM

## 2023-06-21 ENCOUNTER — OUTPATIENT (OUTPATIENT)
Dept: OUTPATIENT SERVICES | Facility: HOSPITAL | Age: 58
LOS: 1 days | End: 2023-06-21
Payer: COMMERCIAL

## 2023-06-21 ENCOUNTER — APPOINTMENT (OUTPATIENT)
Dept: OPHTHALMOLOGY | Facility: CLINIC | Age: 58
End: 2023-06-21
Payer: COMMERCIAL

## 2023-06-21 DIAGNOSIS — H53.8 OTHER VISUAL DISTURBANCES: ICD-10-CM

## 2023-06-21 PROCEDURE — 92134 CPTRZ OPH DX IMG PST SGM RTA: CPT | Mod: 26

## 2023-06-21 PROCEDURE — 92014 COMPRE OPH EXAM EST PT 1/>: CPT

## 2023-06-21 PROCEDURE — 92134 CPTRZ OPH DX IMG PST SGM RTA: CPT

## 2023-06-21 PROCEDURE — 92014 COMPRE OPH EXAM EST PT 1/>: CPT | Mod: 24

## 2023-06-30 DIAGNOSIS — Z96.1 PRESENCE OF INTRAOCULAR LENS: ICD-10-CM

## 2023-06-30 DIAGNOSIS — E11.3399 TYPE 2 DIABETES MELLITUS WITH MODERATE NONPROLIFERATIVE DIABETIC RETINOPATHY WITHOUT MACULAR EDEMA, UNSPECIFIED EYE: ICD-10-CM

## 2023-09-26 ENCOUNTER — OUTPATIENT (OUTPATIENT)
Dept: OUTPATIENT SERVICES | Facility: HOSPITAL | Age: 58
LOS: 1 days | End: 2023-09-26
Payer: COMMERCIAL

## 2023-09-26 ENCOUNTER — APPOINTMENT (OUTPATIENT)
Dept: OPHTHALMOLOGY | Facility: CLINIC | Age: 58
End: 2023-09-26
Payer: COMMERCIAL

## 2023-09-26 DIAGNOSIS — H53.8 OTHER VISUAL DISTURBANCES: ICD-10-CM

## 2023-09-26 PROCEDURE — 92014 COMPRE OPH EXAM EST PT 1/>: CPT

## 2023-09-26 PROCEDURE — 92134 CPTRZ OPH DX IMG PST SGM RTA: CPT | Mod: 26

## 2023-09-26 PROCEDURE — 92134 CPTRZ OPH DX IMG PST SGM RTA: CPT

## 2023-10-10 DIAGNOSIS — H25.13 AGE-RELATED NUCLEAR CATARACT, BILATERAL: ICD-10-CM

## 2023-10-10 DIAGNOSIS — E11.3213 TYPE 2 DIABETES MELLITUS WITH MILD NONPROLIFERATIVE DIABETIC RETINOPATHY WITH MACULAR EDEMA, BILATERAL: ICD-10-CM

## 2023-10-10 DIAGNOSIS — E11.3499 TYPE 2 DIABETES MELLITUS WITH SEVERE NONPROLIFERATIVE DIABETIC RETINOPATHY WITHOUT MACULAR EDEMA, UNSPECIFIED EYE: ICD-10-CM

## 2023-10-10 DIAGNOSIS — H25.22 AGE-RELATED CATARACT, MORGAGNIAN TYPE, LEFT EYE: ICD-10-CM

## 2023-10-12 ENCOUNTER — APPOINTMENT (OUTPATIENT)
Dept: INTERNAL MEDICINE | Facility: CLINIC | Age: 58
End: 2023-10-12

## 2023-10-20 NOTE — REVIEW OF SYSTEMS
done [Vision Problems] : vision problems [Negative] : Constitutional [Chest Pain] : no chest pain [Palpitations] : no palpitations [Shortness Of Breath] : no shortness of breath

## 2023-11-07 ENCOUNTER — APPOINTMENT (OUTPATIENT)
Dept: OPHTHALMOLOGY | Facility: CLINIC | Age: 58
End: 2023-11-07

## 2024-10-25 ENCOUNTER — APPOINTMENT (OUTPATIENT)
Dept: INTERNAL MEDICINE | Facility: CLINIC | Age: 59
End: 2024-10-25

## 2024-10-25 ENCOUNTER — OUTPATIENT (OUTPATIENT)
Dept: OUTPATIENT SERVICES | Facility: HOSPITAL | Age: 59
LOS: 1 days | End: 2024-10-25
Payer: COMMERCIAL

## 2024-10-25 VITALS — DIASTOLIC BLOOD PRESSURE: 79 MMHG | HEART RATE: 77 BPM | SYSTOLIC BLOOD PRESSURE: 187 MMHG

## 2024-10-25 VITALS
OXYGEN SATURATION: 98 % | BODY MASS INDEX: 42.43 KG/M2 | HEIGHT: 66 IN | WEIGHT: 264 LBS | DIASTOLIC BLOOD PRESSURE: 98 MMHG | SYSTOLIC BLOOD PRESSURE: 187 MMHG | TEMPERATURE: 98 F | HEART RATE: 83 BPM

## 2024-10-25 DIAGNOSIS — E11.9 TYPE 2 DIABETES MELLITUS W/OUT COMPLICATIONS: ICD-10-CM

## 2024-10-25 DIAGNOSIS — G47.00 INSOMNIA, UNSPECIFIED: ICD-10-CM

## 2024-10-25 DIAGNOSIS — E78.5 HYPERLIPIDEMIA, UNSPECIFIED: ICD-10-CM

## 2024-10-25 DIAGNOSIS — M79.89 OTHER SPECIFIED SOFT TISSUE DISORDERS: ICD-10-CM

## 2024-10-25 DIAGNOSIS — I10 ESSENTIAL (PRIMARY) HYPERTENSION: ICD-10-CM

## 2024-10-25 DIAGNOSIS — Z00.00 ENCOUNTER FOR GENERAL ADULT MEDICAL EXAMINATION WITHOUT ABNORMAL FINDINGS: ICD-10-CM

## 2024-10-25 PROCEDURE — 84443 ASSAY THYROID STIM HORMONE: CPT

## 2024-10-25 PROCEDURE — 80053 COMPREHEN METABOLIC PANEL: CPT

## 2024-10-25 PROCEDURE — 85027 COMPLETE CBC AUTOMATED: CPT

## 2024-10-25 PROCEDURE — 83036 HEMOGLOBIN GLYCOSYLATED A1C: CPT

## 2024-10-25 PROCEDURE — 80061 LIPID PANEL: CPT

## 2024-10-25 PROCEDURE — 99214 OFFICE O/P EST MOD 30 MIN: CPT

## 2024-10-25 RX ORDER — METFORMIN HYDROCHLORIDE 1000 MG/1
1000 TABLET, COATED ORAL TWICE DAILY
Qty: 60 | Refills: 1 | Status: ACTIVE | COMMUNITY
Start: 2024-10-25 | End: 1900-01-01

## 2024-10-25 RX ORDER — HYDROCHLOROTHIAZIDE 50 MG/1
50 TABLET ORAL DAILY
Qty: 30 | Refills: 1 | Status: ACTIVE | COMMUNITY
Start: 2024-10-25 | End: 1900-01-01

## 2024-10-25 RX ORDER — CHLORHEXIDINE GLUCONATE 4 %
5 LIQUID (ML) TOPICAL
Qty: 30 | Refills: 1 | Status: ACTIVE | COMMUNITY
Start: 2024-10-25 | End: 1900-01-01

## 2024-10-26 LAB
ALBUMIN SERPL ELPH-MCNC: 4.3 G/DL
ALP BLD-CCNC: 146 U/L
ALT SERPL-CCNC: 11 U/L
ANION GAP SERPL CALC-SCNC: 13 MMOL/L
AST SERPL-CCNC: 16 U/L
BILIRUB SERPL-MCNC: 0.4 MG/DL
BUN SERPL-MCNC: 29 MG/DL
CALCIUM SERPL-MCNC: 9.1 MG/DL
CHLORIDE SERPL-SCNC: 102 MMOL/L
CHOLEST SERPL-MCNC: 187 MG/DL
CO2 SERPL-SCNC: 26 MMOL/L
CREAT SERPL-MCNC: 1.4 MG/DL
EGFR: 44 ML/MIN/1.73M2
ESTIMATED AVERAGE GLUCOSE: 154 MG/DL
GLUCOSE SERPL-MCNC: 70 MG/DL
HBA1C MFR BLD HPLC: 7 %
HCT VFR BLD CALC: 33.5 %
HDLC SERPL-MCNC: 41 MG/DL
HGB BLD-MCNC: 11 G/DL
LDLC SERPL CALC-MCNC: 126 MG/DL
MCHC RBC-ENTMCNC: 27.4 PG
MCHC RBC-ENTMCNC: 32.8 G/DL
MCV RBC AUTO: 83.3 FL
NONHDLC SERPL-MCNC: 146 MG/DL
PLATELET # BLD AUTO: 293 K/UL
PMV BLD AUTO: 0 /100 WBCS
PMV BLD: 12 FL
POTASSIUM SERPL-SCNC: 4.5 MMOL/L
PROT SERPL-MCNC: 7.6 G/DL
RBC # BLD: 4.02 M/UL
RBC # FLD: 13.7 %
SODIUM SERPL-SCNC: 141 MMOL/L
TRIGL SERPL-MCNC: 99 MG/DL
TSH SERPL-ACNC: 2.43 UIU/ML
WBC # FLD AUTO: 7.51 K/UL

## 2024-10-29 ENCOUNTER — APPOINTMENT (OUTPATIENT)
Dept: PODIATRY | Facility: CLINIC | Age: 59
End: 2024-10-29

## 2024-10-31 DIAGNOSIS — M79.89 OTHER SPECIFIED SOFT TISSUE DISORDERS: ICD-10-CM

## 2024-10-31 DIAGNOSIS — G47.00 INSOMNIA, UNSPECIFIED: ICD-10-CM

## 2024-10-31 DIAGNOSIS — I10 ESSENTIAL (PRIMARY) HYPERTENSION: ICD-10-CM

## 2024-10-31 DIAGNOSIS — E78.5 HYPERLIPIDEMIA, UNSPECIFIED: ICD-10-CM

## 2024-10-31 DIAGNOSIS — E11.9 TYPE 2 DIABETES MELLITUS WITHOUT COMPLICATIONS: ICD-10-CM

## 2024-11-25 ENCOUNTER — APPOINTMENT (OUTPATIENT)
Dept: INTERNAL MEDICINE | Facility: CLINIC | Age: 59
End: 2024-11-25

## 2024-11-25 ENCOUNTER — OUTPATIENT (OUTPATIENT)
Dept: OUTPATIENT SERVICES | Facility: HOSPITAL | Age: 59
LOS: 1 days | End: 2024-11-25

## 2024-11-25 VITALS
HEIGHT: 66 IN | OXYGEN SATURATION: 96 % | BODY MASS INDEX: 42.43 KG/M2 | DIASTOLIC BLOOD PRESSURE: 98 MMHG | SYSTOLIC BLOOD PRESSURE: 173 MMHG | TEMPERATURE: 97.8 F | HEART RATE: 77 BPM | WEIGHT: 264 LBS

## 2024-11-25 VITALS — SYSTOLIC BLOOD PRESSURE: 144 MMHG | DIASTOLIC BLOOD PRESSURE: 84 MMHG

## 2024-11-25 DIAGNOSIS — Z00.00 ENCOUNTER FOR GENERAL ADULT MEDICAL EXAMINATION WITHOUT ABNORMAL FINDINGS: ICD-10-CM

## 2024-11-25 DIAGNOSIS — E11.9 TYPE 2 DIABETES MELLITUS W/OUT COMPLICATIONS: ICD-10-CM

## 2024-11-25 DIAGNOSIS — E78.5 HYPERLIPIDEMIA, UNSPECIFIED: ICD-10-CM

## 2024-11-25 PROCEDURE — 99214 OFFICE O/P EST MOD 30 MIN: CPT

## 2024-11-27 RX ORDER — AMLODIPINE BESYLATE 5 MG/1
5 TABLET ORAL DAILY
Qty: 90 | Refills: 0 | Status: ACTIVE | COMMUNITY
Start: 2024-11-25 | End: 1900-01-01

## 2024-12-02 DIAGNOSIS — E78.5 HYPERLIPIDEMIA, UNSPECIFIED: ICD-10-CM

## 2024-12-02 DIAGNOSIS — E11.9 TYPE 2 DIABETES MELLITUS WITHOUT COMPLICATIONS: ICD-10-CM

## 2025-02-24 ENCOUNTER — OUTPATIENT (OUTPATIENT)
Dept: OUTPATIENT SERVICES | Facility: HOSPITAL | Age: 60
LOS: 1 days | End: 2025-02-24
Payer: COMMERCIAL

## 2025-02-24 ENCOUNTER — APPOINTMENT (OUTPATIENT)
Dept: INTERNAL MEDICINE | Facility: CLINIC | Age: 60
End: 2025-02-24

## 2025-02-24 VITALS
BODY MASS INDEX: 41.14 KG/M2 | HEIGHT: 66 IN | DIASTOLIC BLOOD PRESSURE: 83 MMHG | WEIGHT: 256 LBS | SYSTOLIC BLOOD PRESSURE: 136 MMHG | TEMPERATURE: 97.5 F | OXYGEN SATURATION: 99 % | HEART RATE: 89 BPM

## 2025-02-24 DIAGNOSIS — E66.9 OBESITY, UNSPECIFIED: ICD-10-CM

## 2025-02-24 DIAGNOSIS — E78.5 HYPERLIPIDEMIA, UNSPECIFIED: ICD-10-CM

## 2025-02-24 DIAGNOSIS — I10 ESSENTIAL (PRIMARY) HYPERTENSION: ICD-10-CM

## 2025-02-24 DIAGNOSIS — E11.9 TYPE 2 DIABETES MELLITUS W/OUT COMPLICATIONS: ICD-10-CM

## 2025-02-24 DIAGNOSIS — Z00.00 ENCOUNTER FOR GENERAL ADULT MEDICAL EXAMINATION WITHOUT ABNORMAL FINDINGS: ICD-10-CM

## 2025-02-24 PROCEDURE — 99214 OFFICE O/P EST MOD 30 MIN: CPT

## 2025-02-25 DIAGNOSIS — E11.9 TYPE 2 DIABETES MELLITUS WITHOUT COMPLICATIONS: ICD-10-CM

## 2025-02-25 DIAGNOSIS — E78.5 HYPERLIPIDEMIA, UNSPECIFIED: ICD-10-CM

## 2025-02-25 DIAGNOSIS — I10 ESSENTIAL (PRIMARY) HYPERTENSION: ICD-10-CM

## 2025-02-25 DIAGNOSIS — E66.9 OBESITY, UNSPECIFIED: ICD-10-CM

## 2025-03-22 ENCOUNTER — INPATIENT (INPATIENT)
Facility: HOSPITAL | Age: 60
LOS: 1 days | Discharge: HOME | DRG: 199 | End: 2025-03-24
Attending: STUDENT IN AN ORGANIZED HEALTH CARE EDUCATION/TRAINING PROGRAM | Admitting: INTERNAL MEDICINE
Payer: MEDICAID

## 2025-03-22 VITALS
OXYGEN SATURATION: 100 % | DIASTOLIC BLOOD PRESSURE: 110 MMHG | HEART RATE: 73 BPM | RESPIRATION RATE: 18 BRPM | TEMPERATURE: 98 F | SYSTOLIC BLOOD PRESSURE: 190 MMHG

## 2025-03-22 LAB
ALBUMIN SERPL ELPH-MCNC: 3.8 G/DL — SIGNIFICANT CHANGE UP (ref 3.5–5.2)
ALBUMIN SERPL ELPH-MCNC: 4.1 G/DL — SIGNIFICANT CHANGE UP (ref 3.5–5.2)
ALBUMIN SERPL ELPH-MCNC: 4.2 G/DL — SIGNIFICANT CHANGE UP (ref 3.5–5.2)
ALP SERPL-CCNC: 148 U/L — HIGH (ref 30–115)
ALP SERPL-CCNC: 150 U/L — HIGH (ref 30–115)
ALP SERPL-CCNC: 161 U/L — HIGH (ref 30–115)
ALT FLD-CCNC: 11 U/L — SIGNIFICANT CHANGE UP (ref 0–41)
ALT FLD-CCNC: 11 U/L — SIGNIFICANT CHANGE UP (ref 0–41)
ALT FLD-CCNC: 12 U/L — SIGNIFICANT CHANGE UP (ref 0–41)
ANION GAP SERPL CALC-SCNC: 13 MMOL/L — SIGNIFICANT CHANGE UP (ref 7–14)
ANION GAP SERPL CALC-SCNC: 15 MMOL/L — HIGH (ref 7–14)
ANION GAP SERPL CALC-SCNC: 17 MMOL/L — HIGH (ref 7–14)
APPEARANCE UR: ABNORMAL
APTT BLD: 28.2 SEC — SIGNIFICANT CHANGE UP (ref 27–39.2)
AST SERPL-CCNC: 11 U/L — SIGNIFICANT CHANGE UP (ref 0–41)
AST SERPL-CCNC: 14 U/L — SIGNIFICANT CHANGE UP (ref 0–41)
AST SERPL-CCNC: 17 U/L — SIGNIFICANT CHANGE UP (ref 0–41)
B-OH-BUTYR SERPL-SCNC: 0.2 MMOL/L — SIGNIFICANT CHANGE UP
B-OH-BUTYR SERPL-SCNC: <0.2 MMOL/L — SIGNIFICANT CHANGE UP
BASE EXCESS BLDV CALC-SCNC: -0.8 MMOL/L — SIGNIFICANT CHANGE UP (ref -2–3)
BASOPHILS # BLD AUTO: 0.01 K/UL — SIGNIFICANT CHANGE UP (ref 0–0.2)
BASOPHILS # BLD AUTO: 0.03 K/UL — SIGNIFICANT CHANGE UP (ref 0–0.2)
BASOPHILS NFR BLD AUTO: 0.1 % — SIGNIFICANT CHANGE UP (ref 0–1)
BASOPHILS NFR BLD AUTO: 0.3 % — SIGNIFICANT CHANGE UP (ref 0–1)
BILIRUB SERPL-MCNC: 0.5 MG/DL — SIGNIFICANT CHANGE UP (ref 0.2–1.2)
BILIRUB SERPL-MCNC: 0.5 MG/DL — SIGNIFICANT CHANGE UP (ref 0.2–1.2)
BILIRUB SERPL-MCNC: 0.6 MG/DL — SIGNIFICANT CHANGE UP (ref 0.2–1.2)
BILIRUB UR-MCNC: NEGATIVE — SIGNIFICANT CHANGE UP
BUN SERPL-MCNC: 22 MG/DL — HIGH (ref 10–20)
BUN SERPL-MCNC: 22 MG/DL — HIGH (ref 10–20)
BUN SERPL-MCNC: 28 MG/DL — HIGH (ref 10–20)
CA-I SERPL-SCNC: 1.02 MMOL/L — LOW (ref 1.15–1.33)
CALCIUM SERPL-MCNC: 9.1 MG/DL — SIGNIFICANT CHANGE UP (ref 8.4–10.5)
CALCIUM SERPL-MCNC: 9.2 MG/DL — SIGNIFICANT CHANGE UP (ref 8.4–10.5)
CALCIUM SERPL-MCNC: 9.3 MG/DL — SIGNIFICANT CHANGE UP (ref 8.4–10.5)
CHLORIDE SERPL-SCNC: 96 MMOL/L — LOW (ref 98–110)
CHLORIDE SERPL-SCNC: 97 MMOL/L — LOW (ref 98–110)
CHLORIDE SERPL-SCNC: 99 MMOL/L — SIGNIFICANT CHANGE UP (ref 98–110)
CO2 SERPL-SCNC: 21 MMOL/L — SIGNIFICANT CHANGE UP (ref 17–32)
CO2 SERPL-SCNC: 21 MMOL/L — SIGNIFICANT CHANGE UP (ref 17–32)
CO2 SERPL-SCNC: 22 MMOL/L — SIGNIFICANT CHANGE UP (ref 17–32)
COLOR SPEC: YELLOW — SIGNIFICANT CHANGE UP
CREAT SERPL-MCNC: 1.2 MG/DL — SIGNIFICANT CHANGE UP (ref 0.7–1.5)
CREAT SERPL-MCNC: 1.2 MG/DL — SIGNIFICANT CHANGE UP (ref 0.7–1.5)
CREAT SERPL-MCNC: 1.4 MG/DL — SIGNIFICANT CHANGE UP (ref 0.7–1.5)
DIFF PNL FLD: ABNORMAL
EGFR: 44 ML/MIN/1.73M2 — LOW
EGFR: 44 ML/MIN/1.73M2 — LOW
EGFR: 52 ML/MIN/1.73M2 — LOW
EOSINOPHIL # BLD AUTO: 0 K/UL — SIGNIFICANT CHANGE UP (ref 0–0.7)
EOSINOPHIL # BLD AUTO: 0.24 K/UL — SIGNIFICANT CHANGE UP (ref 0–0.7)
EOSINOPHIL NFR BLD AUTO: 0 % — SIGNIFICANT CHANGE UP (ref 0–8)
EOSINOPHIL NFR BLD AUTO: 2.6 % — SIGNIFICANT CHANGE UP (ref 0–8)
FLUAV AG NPH QL: SIGNIFICANT CHANGE UP
FLUBV AG NPH QL: SIGNIFICANT CHANGE UP
GAS PNL BLDV: 131 MMOL/L — LOW (ref 136–145)
GAS PNL BLDV: SIGNIFICANT CHANGE UP
GAS PNL BLDV: SIGNIFICANT CHANGE UP
GLUCOSE BLDC GLUCOMTR-MCNC: 183 MG/DL — HIGH (ref 70–99)
GLUCOSE BLDC GLUCOMTR-MCNC: 280 MG/DL — HIGH (ref 70–99)
GLUCOSE BLDC GLUCOMTR-MCNC: 351 MG/DL — HIGH (ref 70–99)
GLUCOSE BLDC GLUCOMTR-MCNC: 379 MG/DL — HIGH (ref 70–99)
GLUCOSE BLDC GLUCOMTR-MCNC: 390 MG/DL — HIGH (ref 70–99)
GLUCOSE BLDC GLUCOMTR-MCNC: 400 MG/DL — HIGH (ref 70–99)
GLUCOSE BLDC GLUCOMTR-MCNC: 421 MG/DL — HIGH (ref 70–99)
GLUCOSE SERPL-MCNC: 192 MG/DL — HIGH (ref 70–99)
GLUCOSE SERPL-MCNC: 194 MG/DL — HIGH (ref 70–99)
GLUCOSE SERPL-MCNC: 399 MG/DL — HIGH (ref 70–99)
GLUCOSE UR QL: 100 MG/DL
HCO3 BLDV-SCNC: 21 MMOL/L — LOW (ref 22–29)
HCT VFR BLD CALC: 34.8 % — LOW (ref 37–47)
HCT VFR BLD CALC: 35.2 % — LOW (ref 37–47)
HCT VFR BLDA CALC: 41 % — SIGNIFICANT CHANGE UP (ref 34.5–46.5)
HGB BLD CALC-MCNC: 13.7 G/DL — SIGNIFICANT CHANGE UP (ref 11.7–16.1)
HGB BLD-MCNC: 11.6 G/DL — LOW (ref 12–16)
HGB BLD-MCNC: 12 G/DL — SIGNIFICANT CHANGE UP (ref 12–16)
IMM GRANULOCYTES NFR BLD AUTO: 0.5 % — HIGH (ref 0.1–0.3)
IMM GRANULOCYTES NFR BLD AUTO: 0.5 % — HIGH (ref 0.1–0.3)
INR BLD: 0.89 RATIO — SIGNIFICANT CHANGE UP (ref 0.65–1.3)
KETONES UR-MCNC: ABNORMAL MG/DL
LACTATE BLDV-MCNC: 2.5 MMOL/L — HIGH (ref 0.5–2)
LACTATE SERPL-SCNC: 3.4 MMOL/L — HIGH (ref 0.7–2)
LACTATE SERPL-SCNC: 3.5 MMOL/L — HIGH (ref 0.7–2)
LEUKOCYTE ESTERASE UR-ACNC: ABNORMAL
LYMPHOCYTES # BLD AUTO: 1.62 K/UL — SIGNIFICANT CHANGE UP (ref 1.2–3.4)
LYMPHOCYTES # BLD AUTO: 14.7 % — LOW (ref 20.5–51.1)
LYMPHOCYTES # BLD AUTO: 2.13 K/UL — SIGNIFICANT CHANGE UP (ref 1.2–3.4)
LYMPHOCYTES # BLD AUTO: 22.7 % — SIGNIFICANT CHANGE UP (ref 20.5–51.1)
MAGNESIUM SERPL-MCNC: 2.1 MG/DL — SIGNIFICANT CHANGE UP (ref 1.8–2.4)
MAGNESIUM SERPL-MCNC: 2.2 MG/DL — SIGNIFICANT CHANGE UP (ref 1.8–2.4)
MCHC RBC-ENTMCNC: 26.4 PG — LOW (ref 27–31)
MCHC RBC-ENTMCNC: 27.5 PG — SIGNIFICANT CHANGE UP (ref 27–31)
MCHC RBC-ENTMCNC: 33 G/DL — SIGNIFICANT CHANGE UP (ref 32–37)
MCHC RBC-ENTMCNC: 34.5 G/DL — SIGNIFICANT CHANGE UP (ref 32–37)
MCV RBC AUTO: 79.6 FL — LOW (ref 81–99)
MCV RBC AUTO: 80.2 FL — LOW (ref 81–99)
MONOCYTES # BLD AUTO: 0.55 K/UL — SIGNIFICANT CHANGE UP (ref 0.1–0.6)
MONOCYTES # BLD AUTO: 0.57 K/UL — SIGNIFICANT CHANGE UP (ref 0.1–0.6)
MONOCYTES NFR BLD AUTO: 5 % — SIGNIFICANT CHANGE UP (ref 1.7–9.3)
MONOCYTES NFR BLD AUTO: 6.1 % — SIGNIFICANT CHANGE UP (ref 1.7–9.3)
NEUTROPHILS # BLD AUTO: 6.36 K/UL — SIGNIFICANT CHANGE UP (ref 1.4–6.5)
NEUTROPHILS # BLD AUTO: 8.78 K/UL — HIGH (ref 1.4–6.5)
NEUTROPHILS NFR BLD AUTO: 67.8 % — SIGNIFICANT CHANGE UP (ref 42.2–75.2)
NEUTROPHILS NFR BLD AUTO: 79.7 % — HIGH (ref 42.2–75.2)
NITRITE UR-MCNC: POSITIVE
NRBC BLD AUTO-RTO: 0 /100 WBCS — SIGNIFICANT CHANGE UP (ref 0–0)
NRBC BLD AUTO-RTO: 0 /100 WBCS — SIGNIFICANT CHANGE UP (ref 0–0)
NT-PROBNP SERPL-SCNC: 317 PG/ML — HIGH (ref 0–300)
OSMOLALITY SERPL: 306 MOS/KG — HIGH (ref 275–300)
PCO2 BLDV: 27 MMHG — LOW (ref 39–42)
PH BLDV: 7.5 — HIGH (ref 7.32–7.43)
PH UR: 8 — SIGNIFICANT CHANGE UP (ref 5–8)
PHOSPHATE SERPL-MCNC: 3.3 MG/DL — SIGNIFICANT CHANGE UP (ref 2.1–4.9)
PLATELET # BLD AUTO: 331 K/UL — SIGNIFICANT CHANGE UP (ref 130–400)
PLATELET # BLD AUTO: 348 K/UL — SIGNIFICANT CHANGE UP (ref 130–400)
PMV BLD: 11.1 FL — HIGH (ref 7.4–10.4)
PMV BLD: 11.2 FL — HIGH (ref 7.4–10.4)
PO2 BLDV: 54 MMHG — HIGH (ref 25–45)
POTASSIUM BLDV-SCNC: 4.4 MMOL/L — SIGNIFICANT CHANGE UP (ref 3.5–5.1)
POTASSIUM SERPL-MCNC: 4.3 MMOL/L — SIGNIFICANT CHANGE UP (ref 3.5–5)
POTASSIUM SERPL-MCNC: 4.6 MMOL/L — SIGNIFICANT CHANGE UP (ref 3.5–5)
POTASSIUM SERPL-MCNC: 4.8 MMOL/L — SIGNIFICANT CHANGE UP (ref 3.5–5)
POTASSIUM SERPL-SCNC: 4.3 MMOL/L — SIGNIFICANT CHANGE UP (ref 3.5–5)
POTASSIUM SERPL-SCNC: 4.6 MMOL/L — SIGNIFICANT CHANGE UP (ref 3.5–5)
POTASSIUM SERPL-SCNC: 4.8 MMOL/L — SIGNIFICANT CHANGE UP (ref 3.5–5)
PROT SERPL-MCNC: 7.4 G/DL — SIGNIFICANT CHANGE UP (ref 6–8)
PROT SERPL-MCNC: 7.6 G/DL — SIGNIFICANT CHANGE UP (ref 6–8)
PROT SERPL-MCNC: 8 G/DL — SIGNIFICANT CHANGE UP (ref 6–8)
PROT UR-MCNC: 30 MG/DL
PROTHROM AB SERPL-ACNC: 10.5 SEC — SIGNIFICANT CHANGE UP (ref 9.95–12.87)
RBC # BLD: 4.37 M/UL — SIGNIFICANT CHANGE UP (ref 4.2–5.4)
RBC # BLD: 4.39 M/UL — SIGNIFICANT CHANGE UP (ref 4.2–5.4)
RBC # FLD: 13.2 % — SIGNIFICANT CHANGE UP (ref 11.5–14.5)
RBC # FLD: 13.2 % — SIGNIFICANT CHANGE UP (ref 11.5–14.5)
RSV RNA NPH QL NAA+NON-PROBE: SIGNIFICANT CHANGE UP
SAO2 % BLDV: 91.1 % — HIGH (ref 67–88)
SARS-COV-2 RNA SPEC QL NAA+PROBE: SIGNIFICANT CHANGE UP
SODIUM SERPL-SCNC: 131 MMOL/L — LOW (ref 135–146)
SODIUM SERPL-SCNC: 135 MMOL/L — SIGNIFICANT CHANGE UP (ref 135–146)
SODIUM SERPL-SCNC: 135 MMOL/L — SIGNIFICANT CHANGE UP (ref 135–146)
SOURCE RESPIRATORY: SIGNIFICANT CHANGE UP
SP GR SPEC: 1.02 — SIGNIFICANT CHANGE UP (ref 1–1.03)
TROPONIN T, HIGH SENSITIVITY RESULT: 10 NG/L — SIGNIFICANT CHANGE UP (ref 6–13)
TROPONIN T, HIGH SENSITIVITY RESULT: 13 NG/L — SIGNIFICANT CHANGE UP (ref 6–13)
TROPONIN T, HIGH SENSITIVITY RESULT: 13 NG/L — SIGNIFICANT CHANGE UP (ref 6–13)
UROBILINOGEN FLD QL: 1 MG/DL — SIGNIFICANT CHANGE UP (ref 0.2–1)
WBC # BLD: 11.02 K/UL — HIGH (ref 4.8–10.8)
WBC # BLD: 9.38 K/UL — SIGNIFICANT CHANGE UP (ref 4.8–10.8)
WBC # FLD AUTO: 11.02 K/UL — HIGH (ref 4.8–10.8)
WBC # FLD AUTO: 9.38 K/UL — SIGNIFICANT CHANGE UP (ref 4.8–10.8)

## 2025-03-22 PROCEDURE — 84436 ASSAY OF TOTAL THYROXINE: CPT

## 2025-03-22 PROCEDURE — 80053 COMPREHEN METABOLIC PANEL: CPT

## 2025-03-22 PROCEDURE — 99291 CRITICAL CARE FIRST HOUR: CPT

## 2025-03-22 PROCEDURE — 86901 BLOOD TYPING SEROLOGIC RH(D): CPT

## 2025-03-22 PROCEDURE — 83605 ASSAY OF LACTIC ACID: CPT

## 2025-03-22 PROCEDURE — 84100 ASSAY OF PHOSPHORUS: CPT

## 2025-03-22 PROCEDURE — 95819 EEG AWAKE AND ASLEEP: CPT

## 2025-03-22 PROCEDURE — 70544 MR ANGIOGRAPHY HEAD W/O DYE: CPT | Mod: MC

## 2025-03-22 PROCEDURE — 70498 CT ANGIOGRAPHY NECK: CPT | Mod: 26

## 2025-03-22 PROCEDURE — 85025 COMPLETE CBC W/AUTO DIFF WBC: CPT

## 2025-03-22 PROCEDURE — 99222 1ST HOSP IP/OBS MODERATE 55: CPT

## 2025-03-22 PROCEDURE — 80061 LIPID PANEL: CPT

## 2025-03-22 PROCEDURE — 84443 ASSAY THYROID STIM HORMONE: CPT

## 2025-03-22 PROCEDURE — 86850 RBC ANTIBODY SCREEN: CPT

## 2025-03-22 PROCEDURE — 86900 BLOOD TYPING SEROLOGIC ABO: CPT

## 2025-03-22 PROCEDURE — 70450 CT HEAD/BRAIN W/O DYE: CPT | Mod: 26

## 2025-03-22 PROCEDURE — 70496 CT ANGIOGRAPHY HEAD: CPT | Mod: 26

## 2025-03-22 PROCEDURE — 82010 KETONE BODYS QUAN: CPT

## 2025-03-22 PROCEDURE — 83036 HEMOGLOBIN GLYCOSYLATED A1C: CPT

## 2025-03-22 PROCEDURE — 71045 X-RAY EXAM CHEST 1 VIEW: CPT | Mod: 26

## 2025-03-22 PROCEDURE — 93010 ELECTROCARDIOGRAM REPORT: CPT

## 2025-03-22 PROCEDURE — 82962 GLUCOSE BLOOD TEST: CPT

## 2025-03-22 PROCEDURE — 36415 COLL VENOUS BLD VENIPUNCTURE: CPT

## 2025-03-22 PROCEDURE — 70551 MRI BRAIN STEM W/O DYE: CPT | Mod: MC

## 2025-03-22 PROCEDURE — 83735 ASSAY OF MAGNESIUM: CPT

## 2025-03-22 RX ORDER — DEXAMETHASONE 0.5 MG/1
10 TABLET ORAL ONCE
Refills: 0 | Status: COMPLETED | OUTPATIENT
Start: 2025-03-22 | End: 2025-03-22

## 2025-03-22 RX ORDER — CEFTRIAXONE 500 MG/1
1000 INJECTION, POWDER, FOR SOLUTION INTRAMUSCULAR; INTRAVENOUS ONCE
Refills: 0 | Status: COMPLETED | OUTPATIENT
Start: 2025-03-22 | End: 2025-03-22

## 2025-03-22 RX ORDER — INSULIN LISPRO 100 U/ML
INJECTION, SOLUTION INTRAVENOUS; SUBCUTANEOUS EVERY 6 HOURS
Refills: 0 | Status: DISCONTINUED | OUTPATIENT
Start: 2025-03-22 | End: 2025-03-24

## 2025-03-22 RX ORDER — DEXTROSE 50 % IN WATER 50 %
25 SYRINGE (ML) INTRAVENOUS ONCE
Refills: 0 | Status: DISCONTINUED | OUTPATIENT
Start: 2025-03-22 | End: 2025-03-24

## 2025-03-22 RX ORDER — ONDANSETRON HCL/PF 4 MG/2 ML
4 VIAL (ML) INJECTION ONCE
Refills: 0 | Status: COMPLETED | OUTPATIENT
Start: 2025-03-22 | End: 2025-03-22

## 2025-03-22 RX ORDER — SENNA 187 MG
2 TABLET ORAL AT BEDTIME
Refills: 0 | Status: DISCONTINUED | OUTPATIENT
Start: 2025-03-22 | End: 2025-03-24

## 2025-03-22 RX ORDER — MAGNESIUM SULFATE 500 MG/ML
2 SYRINGE (ML) INJECTION ONCE
Refills: 0 | Status: COMPLETED | OUTPATIENT
Start: 2025-03-22 | End: 2025-03-22

## 2025-03-22 RX ORDER — METOCLOPRAMIDE HCL 10 MG
10 TABLET ORAL ONCE
Refills: 0 | Status: COMPLETED | OUTPATIENT
Start: 2025-03-22 | End: 2025-03-22

## 2025-03-22 RX ORDER — HYDROCHLOROTHIAZIDE 50 MG/1
1 TABLET ORAL
Refills: 0 | DISCHARGE

## 2025-03-22 RX ORDER — CEFTRIAXONE 500 MG/1
1000 INJECTION, POWDER, FOR SOLUTION INTRAMUSCULAR; INTRAVENOUS EVERY 24 HOURS
Refills: 0 | Status: DISCONTINUED | OUTPATIENT
Start: 2025-03-23 | End: 2025-03-23

## 2025-03-22 RX ORDER — KETOROLAC TROMETHAMINE 30 MG/ML
15 INJECTION, SOLUTION INTRAMUSCULAR; INTRAVENOUS ONCE
Refills: 0 | Status: DISCONTINUED | OUTPATIENT
Start: 2025-03-22 | End: 2025-03-22

## 2025-03-22 RX ORDER — INSULIN LISPRO 100 U/ML
9 INJECTION, SOLUTION INTRAVENOUS; SUBCUTANEOUS
Refills: 0 | Status: DISCONTINUED | OUTPATIENT
Start: 2025-03-22 | End: 2025-03-24

## 2025-03-22 RX ORDER — INSULIN GLARGINE-YFGN 100 [IU]/ML
27 INJECTION, SOLUTION SUBCUTANEOUS AT BEDTIME
Refills: 0 | Status: DISCONTINUED | OUTPATIENT
Start: 2025-03-22 | End: 2025-03-22

## 2025-03-22 RX ORDER — ROSUVASTATIN CALCIUM 5 MG/1
10 TABLET, FILM COATED ORAL AT BEDTIME
Refills: 0 | Status: DISCONTINUED | OUTPATIENT
Start: 2025-03-22 | End: 2025-03-22

## 2025-03-22 RX ORDER — INSULIN GLARGINE-YFGN 100 [IU]/ML
27 INJECTION, SOLUTION SUBCUTANEOUS AT BEDTIME
Refills: 0 | Status: DISCONTINUED | OUTPATIENT
Start: 2025-03-23 | End: 2025-03-24

## 2025-03-22 RX ORDER — GLUCAGON 3 MG/1
1 POWDER NASAL ONCE
Refills: 0 | Status: DISCONTINUED | OUTPATIENT
Start: 2025-03-22 | End: 2025-03-24

## 2025-03-22 RX ORDER — LOSARTAN POTASSIUM 100 MG/1
50 TABLET, FILM COATED ORAL DAILY
Refills: 0 | Status: DISCONTINUED | OUTPATIENT
Start: 2025-03-22 | End: 2025-03-22

## 2025-03-22 RX ORDER — AMLODIPINE BESYLATE 10 MG/1
5 TABLET ORAL DAILY
Refills: 0 | Status: DISCONTINUED | OUTPATIENT
Start: 2025-03-22 | End: 2025-03-23

## 2025-03-22 RX ORDER — ACETAMINOPHEN 500 MG/5ML
650 LIQUID (ML) ORAL EVERY 6 HOURS
Refills: 0 | Status: DISCONTINUED | OUTPATIENT
Start: 2025-03-22 | End: 2025-03-24

## 2025-03-22 RX ORDER — INFLUENZA A VIRUS A/IDAHO/07/2018 (H1N1) ANTIGEN (MDCK CELL DERIVED, PROPIOLACTONE INACTIVATED, INFLUENZA A VIRUS A/INDIANA/08/2018 (H3N2) ANTIGEN (MDCK CELL DERIVED, PROPIOLACTONE INACTIVATED), INFLUENZA B VIRUS B/SINGAPORE/INFTT-16-0610/2016 ANTIGEN (MDCK CELL DERIVED, PROPIOLACTONE INACTIVATED), INFLUENZA B VIRUS B/IOWA/06/2017 ANTIGEN (MDCK CELL DERIVED, PROPIOLACTONE INACTIVATED) 15; 15; 15; 15 UG/.5ML; UG/.5ML; UG/.5ML; UG/.5ML
0.5 INJECTION, SUSPENSION INTRAMUSCULAR ONCE
Refills: 0 | Status: DISCONTINUED | OUTPATIENT
Start: 2025-03-22 | End: 2025-03-24

## 2025-03-22 RX ORDER — ACETAMINOPHEN 500 MG/5ML
975 LIQUID (ML) ORAL ONCE
Refills: 0 | Status: COMPLETED | OUTPATIENT
Start: 2025-03-22 | End: 2025-03-22

## 2025-03-22 RX ORDER — SODIUM CHLORIDE 9 G/1000ML
1000 INJECTION, SOLUTION INTRAVENOUS
Refills: 0 | Status: DISCONTINUED | OUTPATIENT
Start: 2025-03-22 | End: 2025-03-24

## 2025-03-22 RX ORDER — CEFPODOXIME PROXETIL 200 MG/1
100 TABLET, FILM COATED ORAL ONCE
Refills: 0 | Status: COMPLETED | OUTPATIENT
Start: 2025-03-22 | End: 2025-03-22

## 2025-03-22 RX ORDER — VANCOMYCIN HCL IN 5 % DEXTROSE 1.5G/250ML
1000 PLASTIC BAG, INJECTION (ML) INTRAVENOUS ONCE
Refills: 0 | Status: DISCONTINUED | OUTPATIENT
Start: 2025-03-22 | End: 2025-03-22

## 2025-03-22 RX ORDER — SODIUM CHLORIDE 9 G/1000ML
1000 INJECTION, SOLUTION INTRAVENOUS
Refills: 0 | Status: DISCONTINUED | OUTPATIENT
Start: 2025-03-22 | End: 2025-03-23

## 2025-03-22 RX ORDER — POLYETHYLENE GLYCOL 3350 17 G/17G
17 POWDER, FOR SOLUTION ORAL DAILY
Refills: 0 | Status: DISCONTINUED | OUTPATIENT
Start: 2025-03-22 | End: 2025-03-24

## 2025-03-22 RX ORDER — ENOXAPARIN SODIUM 100 MG/ML
40 INJECTION SUBCUTANEOUS EVERY 24 HOURS
Refills: 0 | Status: DISCONTINUED | OUTPATIENT
Start: 2025-03-22 | End: 2025-03-24

## 2025-03-22 RX ORDER — DEXTROSE 50 % IN WATER 50 %
12.5 SYRINGE (ML) INTRAVENOUS ONCE
Refills: 0 | Status: DISCONTINUED | OUTPATIENT
Start: 2025-03-22 | End: 2025-03-24

## 2025-03-22 RX ORDER — DEXTROSE 50 % IN WATER 50 %
15 SYRINGE (ML) INTRAVENOUS ONCE
Refills: 0 | Status: DISCONTINUED | OUTPATIENT
Start: 2025-03-22 | End: 2025-03-24

## 2025-03-22 RX ORDER — VANCOMYCIN HCL IN 5 % DEXTROSE 1.5G/250ML
1000 PLASTIC BAG, INJECTION (ML) INTRAVENOUS ONCE
Refills: 0 | Status: COMPLETED | OUTPATIENT
Start: 2025-03-22 | End: 2025-03-22

## 2025-03-22 RX ORDER — LOSARTAN POTASSIUM 100 MG/1
1 TABLET, FILM COATED ORAL
Refills: 0 | DISCHARGE

## 2025-03-22 RX ORDER — SODIUM CHLORIDE 9 G/1000ML
1000 INJECTION, SOLUTION INTRAVENOUS ONCE
Refills: 0 | Status: COMPLETED | OUTPATIENT
Start: 2025-03-22 | End: 2025-03-22

## 2025-03-22 RX ORDER — INSULIN GLARGINE-YFGN 100 [IU]/ML
30 INJECTION, SOLUTION SUBCUTANEOUS ONCE
Refills: 0 | Status: COMPLETED | OUTPATIENT
Start: 2025-03-22 | End: 2025-03-22

## 2025-03-22 RX ADMIN — CEFPODOXIME PROXETIL 100 MILLIGRAM(S): 200 TABLET, FILM COATED ORAL at 03:35

## 2025-03-22 RX ADMIN — KETOROLAC TROMETHAMINE 15 MILLIGRAM(S): 30 INJECTION, SOLUTION INTRAMUSCULAR; INTRAVENOUS at 02:43

## 2025-03-22 RX ADMIN — Medication 4 MILLIGRAM(S): at 01:59

## 2025-03-22 RX ADMIN — DEXAMETHASONE 10 MILLIGRAM(S): 0.5 TABLET ORAL at 02:43

## 2025-03-22 RX ADMIN — Medication 10 UNIT(S): at 12:42

## 2025-03-22 RX ADMIN — SODIUM CHLORIDE 100 MILLILITER(S): 9 INJECTION, SOLUTION INTRAVENOUS at 12:43

## 2025-03-22 RX ADMIN — CEFTRIAXONE 100 MILLIGRAM(S): 500 INJECTION, POWDER, FOR SOLUTION INTRAMUSCULAR; INTRAVENOUS at 08:05

## 2025-03-22 RX ADMIN — Medication 8 UNIT(S): at 10:09

## 2025-03-22 RX ADMIN — INSULIN LISPRO 9 UNIT(S): 100 INJECTION, SOLUTION INTRAVENOUS; SUBCUTANEOUS at 21:19

## 2025-03-22 RX ADMIN — Medication 975 MILLIGRAM(S): at 08:04

## 2025-03-22 RX ADMIN — Medication 25 GRAM(S): at 02:43

## 2025-03-22 RX ADMIN — Medication 10 MILLIGRAM(S): at 02:43

## 2025-03-22 RX ADMIN — INSULIN LISPRO 10: 100 INJECTION, SOLUTION INTRAVENOUS; SUBCUTANEOUS at 12:42

## 2025-03-22 RX ADMIN — Medication 250 MILLIGRAM(S): at 10:09

## 2025-03-22 RX ADMIN — Medication 2 TABLET(S): at 21:20

## 2025-03-22 RX ADMIN — ENOXAPARIN SODIUM 40 MILLIGRAM(S): 100 INJECTION SUBCUTANEOUS at 18:47

## 2025-03-22 RX ADMIN — KETOROLAC TROMETHAMINE 15 MILLIGRAM(S): 30 INJECTION, SOLUTION INTRAMUSCULAR; INTRAVENOUS at 06:58

## 2025-03-22 RX ADMIN — SODIUM CHLORIDE 1000 MILLILITER(S): 9 INJECTION, SOLUTION INTRAVENOUS at 02:43

## 2025-03-22 RX ADMIN — INSULIN GLARGINE-YFGN 30 UNIT(S): 100 INJECTION, SOLUTION SUBCUTANEOUS at 16:16

## 2025-03-22 NOTE — ED ADULT TRIAGE NOTE - CHIEF COMPLAINT QUOTE
pt complaining of headache and vomiting that started yesterday afternoon. denies any blurry vision, no dizziness.

## 2025-03-22 NOTE — H&P ADULT - ASSESSMENT
#Misc  #Code Status:  #DVT ppx:  #GI ppx:  #Diet:  #Activity:  #Inpatient Dispo:  #Discharge Dispo:    #Pending: Ms. Bosch is 59yo F with a PMH of HTN and DM admitted for further workup of hypoactive alerted mental status in the setting of recent sharp headache and episode of vomiting.    #Hypoactive Altered Mental Status possibly due to Toxic Metabolic Encephalopathy from Cystitis vs. Seizure Episode vs. TIA  #Lactic Acidosis  #Hyperglycemia  - Patient stated she felt cold over last 3d per   - Sharp headache yesterday evening with episode of vomiting  - Found to have defecated herself in ED  - No sepsis on admission  - UA grossly positive, FSG >400, BHB 0.2  - CT Head and CTA Head and Neck negative  - Seen by Neurology in ED  - Hold all home oral hypoglycemics  - Insulin sliding scale w/ additional doses as needed (received 10u IV in ED)  - Obtain EEG, MR and MRV Head per Neuro  - C/w Ceftriaxone 1g IV  - Monitor lactate, c/w IVF  - ID eval  - Neuro f/u    #HTN  #CKD IIIa  - C/w Losartan 50mg QD  - C/w Amlodipine 5mg QD  - Hold home HCTZ 50mg QD    #HLD  - C/w home Atorvastatin 10mg QHS    #Misc  #Code Status: Full Code  #DVT ppx: Lovenox  #GI ppx: N/A  #Diet: NPO until speech, then CC/DASH   #Activity: Ambulate as Tolerated  #Inpatient Dispo: GMF  #Discharge Dispo: From home    #Pending: EEG, MR Head Ms. Bosch is 59yo F with a PMH of HTN and DM admitted for further workup of hypoactive alerted mental status in the setting of recent sharp headache and episode of vomiting.    #Hypoactive Altered Mental Status possibly due to Toxic Metabolic Encephalopathy from Cystitis vs. Seizure Episode vs. TIA  #Lactic Acidosis  #Hyperglycemia  - Patient stated she felt cold over last 3d per   - Sharp headache yesterday evening with episode of vomiting  - Found to have defecated herself in ED  - No sepsis on admission  - UA grossly positive, FSG >400, BHB 0.2  - CT Head and CTA Head and Neck negative  - Seen by Neurology in ED  - Hold all home oral hypoglycemics  - A1c 9.1 in 2022  - Received 10u IV Insulin  - Start basal bolus 27/9 TID/Sliding Scale +2 (weight is 117kg)  - Obtain EEG, MR and MRV Head per Neuro  - C/w Ceftriaxone 1g IV  - Monitor lactate, c/w IVF  - ID eval  - Neuro f/u    #HTN  #CKD IIIa  - C/w Losartan 50mg QD  - C/w Amlodipine 5mg QD  - Hold home HCTZ 50mg QD    #HLD  - C/w home Atorvastatin 10mg QHS    #Misc  #Code Status: Full Code  #DVT ppx: Lovenox  #GI ppx: N/A  #Diet: CC/DASH  #Activity: Ambulate as Tolerated  #Inpatient Dispo: F  #Discharge Dispo: From home    #Pending: EEG, MR Head Ms. Bosch is 59yo F with a PMH of HTN and DM admitted for further workup of hypoactive alerted mental status in the setting of recent sharp headache and episode of vomiting.    #Hypoactive Altered Mental Status possibly due to Toxic Metabolic Encephalopathy from Cystitis vs. Seizure Episode vs. TIA vs. PRES  #Lactic Acidosis  #Hyperglycemia  - Patient stated she felt cold over last 3d per   - Sharp headache yesterday evening with episode of vomiting  - Found to have defecated herself in ED  - No sepsis on admission  - UA grossly positive, FSG >400, BHB 0.2  - CT Head and CTA Head and Neck negative  - Seen by Neurology in ED  - Hold all home oral hypoglycemics  - A1c 9.1 in 2022  - Received 10u IV Insulin  - Start basal bolus 27/9 TID/Sliding Scale +2 (weight is 117kg)  - Obtain EEG, MR and MRV Head per Neuro  - C/w Ceftriaxone 1g IV  - Monitor lactate, c/w IVF  - ID eval  - Neuro f/u    #HTN  #CKD IIIa  - C/w Losartan 50mg QD  - C/w Amlodipine 5mg QD  - Hold home HCTZ 50mg QD    #HLD  - C/w home Atorvastatin 10mg QHS    #Misc  #Code Status: Full Code  #DVT ppx: Lovenox  #GI ppx: N/A  #Diet: CC/DASH  #Activity: Ambulate as Tolerated  #Inpatient Dispo: GMF  #Discharge Dispo: From home    #Pending: EEG, MR Head Ms. Bosch is 59yo F with a PMH of HTN and DM admitted for further workup of hypoactive alerted mental status in the setting of recent sharp headache and episode of vomiting.    #Hypoactive Altered Mental Status possibly due to Toxic Metabolic Encephalopathy from Cystitis vs. Seizure Episode vs. TIA vs. PRES  #Lactic Acidosis  #Hyperglycemia  - Patient stated she felt cold over last 3d per   - Sharp headache yesterday evening with episode of vomiting  - Found to have defecated herself in ED  - No sepsis on admission  - UA grossly positive, FSG >400, BHB 0.2  - CT Head and CTA Head and Neck negative  - Seen by Neurology in ED  - Hold all home oral hypoglycemics  - A1c 9.1 in 2022  - Received 10u IV Insulin  - Start basal bolus 27/9 TID/Sliding Scale +2 (weight is 117kg)  - Obtain EEG, MR and MRV Head per Neuro  - Re-eval'd patient at 5:00PM now AOx3, eating, stating she does not have headache currently  - C/w Ceftriaxone 1g IV  - Monitor lactate, c/w IVF  - ID eval  - Neuro f/u    #HTN  #CKD IIIa  - C/w Amlodipine 5mg QD    #HLD  - ?Unsure if takes Atorvastatin  - Lipid panel    #Misc  #Code Status: Full Code  #DVT ppx: Lovenox  #GI ppx: N/A  #Diet: CC/DASH  #Activity: Ambulate as Tolerated  #Inpatient Dispo: GMF  #Discharge Dispo: From home    #Pending: EEG, MR Head

## 2025-03-22 NOTE — ED PROVIDER NOTE - OBJECTIVE STATEMENT
60-year-old female past medical history of diabetes, hypertension, dyslipidemia presenting for evaluation of severe headache that started shortly prior to arrival.  Prior to arrival to the ED patient had multiple episodes of vomiting.  Now patient is holding the left side of her head stating she is having severe pain.  Patient had defecated on himself on the ED.  She denies chest pain, shortness of breath, head trauma, LOC, anticoagulation use. 60-year-old female past medical history of DM (on metformin and glipizide) HTN (amlodipine and losartan) dyslipidemia presenting for evaluation of severe headache that started shortly prior to arrival.  Prior to arrival to the ED patient had multiple episodes of vomiting.  Now patient is holding the left side of her head stating she is having severe pain.  Patient had defecated on herself on the ED.  She denies chest pain, shortness of breath, head trauma, LOC, anticoagulation use.

## 2025-03-22 NOTE — H&P ADULT - ATTENDING COMMENTS
60-year-old female with history of HTN, DM2 and HLD cane to for severe left sided headache which started 1.5 hrs ago associated with chills and nausea and vomiting, she denies fever, blurry vision, focal weakness or slurred speech, in the ED BP was elevated 190/110, CT head was negative, UA is positive.      Physical exam:   General: no distress  HEENT: PERRLA  neck: supple  Chest: Clear,   Heart: RRR S1 S2  abdomen: soft, nontender  ext: no edema    A/P:   Headache:   Hypertensive Urgency  improved, no focal weakness, no nuchal rigidity, meningitis is less likely.   CT angio negative.   Seen by neurology, recommended MRV  Patient feels warm, Temp 100F max, WBC10K, s/p vancomycin and Rocephin.   UA is positive, Patient denies dysuria.  Pending RVP, if patient spiked fever start on vancomycin and Rocephin.   For HTN, resume home Amlodipine, Losartan.     Hyperglycemia  DM type 2  lactic acidosis  Lactic diego 3.4, could be from hyperglycemia, AG 17, BHB negative, no DKA  start on Lantus and lispro sliding scale.

## 2025-03-22 NOTE — H&P ADULT - NSHPPHYSICALEXAM_GEN_ALL_CORE
General: Somnolent though rousable  Head: Pupils reactive to light, R eye has slight upper+out gaze  Neck: Supple, no JVD  Cardiac: Regular rate and rhythm.   Pulmonary: CTAB  Abdominal: Soft, nontender, obese  Extremities: No pitting edema  Neurologic: Following commands, moving all extremities, speaks short sentences, closes eyes  Skin: No rashes or lesions

## 2025-03-22 NOTE — ED PROVIDER NOTE - CARE PLAN
Principal Discharge DX:	Headache  Secondary Diagnosis:	UTI (urinary tract infection)   1 Principal Discharge DX:	Hypertensive emergency  Secondary Diagnosis:	UTI (urinary tract infection)  Secondary Diagnosis:	AMS (altered mental status)  Secondary Diagnosis:	Hyperglycemia

## 2025-03-22 NOTE — H&P ADULT - HISTORY OF PRESENT ILLNESS
Vitals  Temp: 97.9 -> 99.4 -> 100  HR: 73 -> 85  BP: 190/110 -> 152/65 -> 124/58  O2: 100% on RA -> 98% on RA  RR: 18 -> 17    Labs  WBC: 9.38  HgB: 12.0  Plt: 331  Cr: 1.2 (baseline)  Trop: 10 -> 13 ->13  FS -> 379  BHB: 0.2  Pro-BNP: 317  Serum Osm: 306  Lactate: 2.5  UA: Grossly positive    Imaging  CT Head: No CT evidence of acute intracranial pathology.    CTA Head and Neck: No significant intracranial vessel stenosis, occlusion or vessel malformation. o carotid or vertebral artery stenosis.    CXR: No focal consolidation, pneumothorax or pleural effusion. Stable cardiomediastinal silhouette. Unchanged osseous structures.    In the ED  1L LR Bolus  Vancomycin 1g IV x1  Ceftriaxone 1g IV x1  Insulin Regular 10u IV x1  Insulin Regular 8u subcutaneous x1  Acetaminophen 975mg PO x1  Ketorolac 15mg IV x2  Magnesium Sulfate 2g IV x1  Metoclopramide 10mg IV x1  Ondansetron 4mg IV x1  Decadron 10mg IV x1    Neurology evaluation recs:  - CT and CTA are normal  - EEG, MRI, MRV and LP if all other workup negative   Vitals  Temp: 97.9 -> 99.4 -> 100  HR: 73 -> 85  BP: 190/110 -> 152/65 -> 124/58  O2: 100% on RA -> 98% on RA  RR: 18 -> 17    Labs  WBC: 9.38  HgB: 12.0  Plt: 331  Cr: 1.2 (baseline)  Trop: 10 -> 13 ->13  FS -> 379  BHB: 0.2  Pro-BNP: 317  Serum Osm: 306  Lactate: 2.5  UA: Grossly positive    Imaging  CT Head: No CT evidence of acute intracranial pathology.    CTA Head and Neck: No significant intracranial vessel stenosis, occlusion or vessel malformation. No carotid or vertebral artery stenosis.    CXR: No focal consolidation, pneumothorax or pleural effusion. Stable cardiomediastinal silhouette. Unchanged osseous structures.    In the ED  1L LR Bolus  Vancomycin 1g IV x1  Ceftriaxone 1g IV x1  Insulin Regular 10u IV x1  Insulin Regular 8u subcutaneous x1  Acetaminophen 975mg PO x1  Ketorolac 15mg IV x2  Magnesium Sulfate 2g IV x1  Metoclopramide 10mg IV x1  Ondansetron 4mg IV x1  Decadron 10mg IV x1    Neurology evaluation recs:  - CT and CTA are normal  - EEG, MRI, MRV and LP if all other workup negative   Ms. Bosch is a 59 yo F with a PMH of diabetes and hypertension presenting for worsening headache and chills and an episode of vomiting x1d. History obtained from  [facility]. Over last several days patient complained of feeling "cold" throughout the day. Last night they were watching TV and the patient stood up and said she was having a severe headache promoting a visit to the ED. She had an episodes of NBNB vomiting outside of the hospital. In the ED she was found to have defecated on herself. She follows verbal commands on exam, denies urinary symptoms, is not able to provide significant history about events leading to admission. Admitted to medicine for further management.    Vitals  Temp: 97.9 -> 99.4 -> 100  HR: 73 -> 85  BP: 190/110 -> 152/65 -> 124/58  O2: 100% on RA -> 98% on RA  RR: 18 -> 17    Labs  WBC: 9.38  HgB: 12.0  Plt: 331  Cr: 1.2 (baseline)  Trop: 10 -> 13 ->13  FS -> 379  BHB: 0.2  Pro-BNP: 317  Serum Osm: 306  Lactate: 2.5  UA: Grossly positive    Imaging  CT Head: No CT evidence of acute intracranial pathology.    CTA Head and Neck: No significant intracranial vessel stenosis, occlusion or vessel malformation. No carotid or vertebral artery stenosis.    CXR: No focal consolidation, pneumothorax or pleural effusion. Stable cardiomediastinal silhouette. Unchanged osseous structures.    In the ED  1L LR Bolus  Vancomycin 1g IV x1  Ceftriaxone 1g IV x1  Insulin Regular 10u IV x1  Insulin Regular 8u subcutaneous x1  Acetaminophen 975mg PO x1  Ketorolac 15mg IV x2  Magnesium Sulfate 2g IV x1  Metoclopramide 10mg IV x1  Ondansetron 4mg IV x1  Decadron 10mg IV x1    Neurology evaluation recs:  - CT and CTA are normal  - EEG, MRI, MRV and LP if all other workup negative

## 2025-03-22 NOTE — ED PROVIDER NOTE - PHYSICAL EXAMINATION
CONSTITUTIONAL: well developed, pt moaning in pain, speaking in full sentences, nontoxic appearing  SKIN: warm, dry, no rash  HEAD: normocephalic, atraumatic  EYES: PERRL, EOMI, no conjunctival erythema  ENT: patent airway, moist mucous membranes, no tongue deviation  NECK: supple, no masses, full flexion/extension without pain  CV:  regular rate, regular rhythm, 2+ radial pulses bilaterally  RESP: no wheezes, no rales, no rhonchi, normal work of breathing  ABD: soft, no tenderness, nondistended, no rebound, no guarding  MSK: no cyanosis, no edema  NEURO: alert, oriented, CN 2-12 grossly intact, sensation intact to light touch symmetrically, 5/5 motor strength in all extremities, no pronator drift, no facial asymmetry,  PSYCH: cooperative, appropriate

## 2025-03-22 NOTE — ED PROVIDER NOTE - CLINICAL SUMMARY MEDICAL DECISION MAKING FREE TEXT BOX
s/o from Dr. Pérez    59 y/o F h/o HTN, HLD, DM p/w HA, chills, nausea. Prior team controlled pt's BP which was noted to be elevated, treated pain, obtained CT imaging. Prelim had concerning findings however was overread by attending radiology as normal. On re-exam, pt was lethargic, difficult to arouse. Resident spoke with family member who consented to LP over the phone, however pt subsequently was awake and alert. At the time, she was capable of making medical decisions and refused the LP. Given the return of mental status back to baseline with BP control, I suspect hypertensive emergency. Pt was seen by neuro as well. Pt admitted for further management and evaluation.    Attending Statement:  I have personally seen the patient.  I provided critical care for  a total of 35 minutes. I provided a substantive portion of the care and the majority of the critical care time.

## 2025-03-22 NOTE — CONSULT NOTE ADULT - ASSESSMENT
60-year-old female past medical history of diabetes, hypertension, dyslipidemia presenting for acute onset of severe L sided headache which started 1.5 hrs ago associated with chills and nausea.  Headache is severe on L side, received reglan, zofran, toradol, decadron in ED and this seemed to help, as patient is now wanting to sleep and endorsing improved HA but still present; she is also experiencing chills.  Exam nonfocal.  Urinalysis suggestive of UTI but patient with no urinary symptoms.      CTA prelim read findings (Moderate right cavernous ICA stenosis; Nonopacification of the anterior superior sagittal sinus, possibly variant) appear incidental and not related to her presenting symptoms.    Recommendations:  - infective/metabolic workup and symptomatic management per ED  - pending final CTA read  - if final read reassuring, can follow up outpatient with neuroendovascular for monitoring of CTA findings    Case discussed with neurology attending Dr. Adame.

## 2025-03-22 NOTE — CONSULT NOTE ADULT - ATTENDING COMMENTS
59 y/o woman with sudden onset headache/sleepiness  r/o infectious cause  EEG, MRI brain and LP if all negative  CT and CTA are normal 61 y/o woman with sudden onset headache/sleepiness  r/o infectious cause  EEG, MRI and MRV brain and LP if all negative  CT and CTA are normal

## 2025-03-22 NOTE — ED PROVIDER NOTE - PROGRESS NOTE DETAILS
TC: CT head negative. BP 150s/60s. Labs wnl. Ua + for UTI s/p abx. TC: CT shows mod R cavernous ICA stenosis; nonopacification of anterior superior sagittal sinus may reflect venous sinus thrombosis versus stenosis. Neurology consulted. MS–on reevaluation patient sleeping comfortably.  Blood pressure 158/67 at this time.  Consult placed to neurology for evaluation of moderate right cavernous ICA stenosis and questionable venous sinus thrombosis. JV: Spoke to neurology, who state if final CT angio reads are similar to preliminary reads, than patient can be cleared by neurology and follow up outpatient with neuroendovascular. If final CT angio reads show differently or are more concerning then neurovascular can follow inpatient. TC: Signed out to Dr. Cason to f/u official CTA read and neurology reccs JOSÉ-- pt signed out to me pending reeval, neuro recs, final CTA    On reeval -- pt still reports severe headache, fatigue, denies nausea. On neuro reeval, intermittently lag in gaze,  patient moving all extremities, denies any blurry vision, no facial asymmetry    BP improved to 142/65 JOSÉ-- on reeval pt still acting very sleepy, lethargic. discussed with neurology team JOSÉ-- on reeval, BP now 124/58, pt now feeling and appearing much better, awake, alert oriented x3 and refusing LP & MRI. Pt reports she does not understand why her BP is still high even though she has been on the same medication for years. Discussed with pt at length about her bloodwork, imaging results. She agrees with plan for admission

## 2025-03-22 NOTE — CONSULT NOTE ADULT - SUBJECTIVE AND OBJECTIVE BOX
NEUROLOGY CONSULT NOTE    60-year-old female past medical history of diabetes, hypertension, dyslipidemia presenting for acute onset of severe L sided headache which started 1.5 hrs ago associated with chills and nausea. Brother says pt tried taking motrin but didn't help and started vomiting so came to ED. While in ED, pt defecated on self. No fever, blurry vision, slurred speech, unilateral weakness, numbness, difficulty walking/swallowing, cp, sob, recent head trauma. No blood thinners.  Headache is severe on L side, received reglan, zofran, toradol, decadron in ED and this seemed to help, as patient is now wanting to sleep and endorsing improved HA but still present; she is also experiencing chills.  Exam nonfocal.  Urinalysis suggestive of UTI but patient with no urinary symptoms.    CTH no acute pathology  CTAs PRELIM READ: Moderate right cavernous ICA stenosis.  Nonopacification of the anterior superior sagittal sinus may reflect venous sinus thrombosis versus stenosis.      VITAL SIGNS:  Vital Signs Last 24 Hrs  T(C): 36.6 (22 Mar 2025 01:09), Max: 36.6 (22 Mar 2025 01:09)  T(F): 97.9 (22 Mar 2025 01:09), Max: 97.9 (22 Mar 2025 01:09)  HR: 73 (22 Mar 2025 01:09) (73 - 73)  BP: 190/110 (22 Mar 2025 01:09) (190/110 - 190/110)  BP(mean): --  RR: 18 (22 Mar 2025 01:09) (18 - 18)  SpO2: 100% (22 Mar 2025 01:09) (100% - 100%)    Parameters below as of 22 Mar 2025 01:09  Patient On (Oxygen Delivery Method): room air      I&O's Summary      PHYSICAL EXAM:  Neurological Exam:  patient experiencing chills throughout examination.  Mental status: Awake, alert and oriented x3.  Recent and remote memory intact.  No dysarthria, no aphasia.    Cranial nerves: Pupils equally round and reactive to light, visual fields full, no nystagmus, extraocular muscles intact, V1 through V3 intact bilaterally and symmetric, face symmetric, hearing intact to finger rub, palate elevation symmetric, tongue was midline.  Motor:  No drift b/l UE and LE.  Normal tone and bulk.  No abnormal movements.    Sensation: Intact to light touch b/l UE and LE.  Coordination: No dysmetria on finger-to-nose.  Gait: deferred      MEDICATIONS:  MEDICATIONS  (STANDING):    MEDICATIONS  (PRN):      ALLERGIES:  Allergies    chloroquine (Pruritus)    Intolerances        LABS:                        12.0   9.38  )-----------( 331      ( 22 Mar 2025 01:35 )             34.8     03-    135  |  99  |  22[H]  ----------------------------<  192[H]  4.3   |  21  |  1.2    Ca    9.1      22 Mar 2025 01:35    TPro  8.0  /  Alb  4.1  /  TBili  0.5  /  DBili  x   /  AST  17  /  ALT  11  /  AlkPhos  150[H]  03-22    PT/INR - ( 22 Mar 2025 01:35 )   PT: 10.50 sec;   INR: 0.89 ratio         PTT - ( 22 Mar 2025 01:35 )  PTT:28.2 sec  Urinalysis Basic - ( 22 Mar 2025 02:20 )    Color: Yellow / Appearance: Turbid / S.024 / pH: x  Gluc: x / Ketone: Trace mg/dL  / Bili: Negative / Urobili: 1.0 mg/dL   Blood: x / Protein: 30 mg/dL / Nitrite: Positive   Leuk Esterase: Moderate / RBC: 80 /HPF / WBC 44 /HPF   Sq Epi: x / Non Sq Epi: 5 /HPF / Bacteria: Many /HPF      CAPILLARY BLOOD GLUCOSE      POCT Blood Glucose.: 174 mg/dL (22 Mar 2025 01:12)      RADIOLOGY & ADDITIONAL TESTS: Reviewed.   NEUROLOGY CONSULT NOTE    60-year-old female past medical history of diabetes, hypertension, dyslipidemia presenting for acute onset of severe L sided headache which started 1.5 hrs ago associated with chills and nausea. Brother says pt tried taking motrin but didn't help and started vomiting so came to ED. While in ED, pt defecated on self. No fever, blurry vision, slurred speech, unilateral weakness, numbness, difficulty walking/swallowing, cp, sob, recent head trauma. No blood thinners.  Headache is severe on L side, received reglan, zofran, toradol, decadron in ED and this seemed to help, as patient is now wanting to sleep and endorsing improved HA but still present; she is also experiencing chills.  Exam nonfocal.  Urinalysis suggestive of UTI but patient with no urinary symptoms.    CTH no acute pathology  CTAs PRELIM READ: Moderate right cavernous ICA stenosis.  Nonopacification of the anterior superior sagittal sinus, possibly variant.      VITAL SIGNS:  Vital Signs Last 24 Hrs  T(C): 36.6 (22 Mar 2025 01:09), Max: 36.6 (22 Mar 2025 01:09)  T(F): 97.9 (22 Mar 2025 01:09), Max: 97.9 (22 Mar 2025 01:09)  HR: 73 (22 Mar 2025 01:09) (73 - 73)  BP: 190/110 (22 Mar 2025 01:09) (190/110 - 190/110)  BP(mean): --  RR: 18 (22 Mar 2025 01:09) (18 - 18)  SpO2: 100% (22 Mar 2025 01:09) (100% - 100%)    Parameters below as of 22 Mar 2025 01:09  Patient On (Oxygen Delivery Method): room air      I&O's Summary      PHYSICAL EXAM:  Neurological Exam:  patient experiencing chills throughout examination.  Mental status: Awake, alert and oriented x3.  Recent and remote memory intact.  No dysarthria, no aphasia.    Cranial nerves: Pupils equally round and reactive to light, visual fields full, no nystagmus, extraocular muscles intact, V1 through V3 intact bilaterally and symmetric, face symmetric, hearing intact to finger rub, palate elevation symmetric, tongue was midline.  Motor:  No drift b/l UE and LE.  Normal tone and bulk.  No abnormal movements.    Sensation: Intact to light touch b/l UE and LE.  Coordination: No dysmetria on finger-to-nose.  Gait: deferred      MEDICATIONS:  MEDICATIONS  (STANDING):    MEDICATIONS  (PRN):      ALLERGIES:  Allergies    chloroquine (Pruritus)    Intolerances        LABS:                        12.0   9.38  )-----------( 331      ( 22 Mar 2025 01:35 )             34.8     03-    135  |  99  |  22[H]  ----------------------------<  192[H]  4.3   |  21  |  1.2    Ca    9.1      22 Mar 2025 01:35    TPro  8.0  /  Alb  4.1  /  TBili  0.5  /  DBili  x   /  AST  17  /  ALT  11  /  AlkPhos  150[H]  03-22    PT/INR - ( 22 Mar 2025 01:35 )   PT: 10.50 sec;   INR: 0.89 ratio         PTT - ( 22 Mar 2025 01:35 )  PTT:28.2 sec  Urinalysis Basic - ( 22 Mar 2025 02:20 )    Color: Yellow / Appearance: Turbid / S.024 / pH: x  Gluc: x / Ketone: Trace mg/dL  / Bili: Negative / Urobili: 1.0 mg/dL   Blood: x / Protein: 30 mg/dL / Nitrite: Positive   Leuk Esterase: Moderate / RBC: 80 /HPF / WBC 44 /HPF   Sq Epi: x / Non Sq Epi: 5 /HPF / Bacteria: Many /HPF      CAPILLARY BLOOD GLUCOSE      POCT Blood Glucose.: 174 mg/dL (22 Mar 2025 01:12)      RADIOLOGY & ADDITIONAL TESTS: Reviewed.

## 2025-03-22 NOTE — ED PROVIDER NOTE - NSFOLLOWUPINSTRUCTIONS_ED_ALL_ED_FT
Our Emergency Department Referral Coordinators will be reaching out to you in the next 24-48 hours from 9:00am to 5:00pm to schedule a follow up appointment. Please expect a phone call from the hospital in that time frame. If you do not receive a call or if you have any questions or concerns, you can reach them at [phone].  ----  General Headache    WHAT YOU NEED TO KNOW:    Headache pain may be mild or severe. Common causes include stress, medicines, and head injuries. Sleep problems, allergies, and hormone changes can also cause a headache. You may have frequent headaches that have no clear cause. Pain may start in another part of your body and move to your head. Headache pain can also move to other parts of your body. A headache can cause other symptoms, such as nausea and vomiting. A severe headache may be a sign of a stroke or other serious problem that needs immediate treatment.    Call 911 for any of the following:   - You have any of the following signs of a stroke:                  - Numbness or drooping on one side of your face                  - Weakness in an arm or leg                 - Confusion or difficulty speaking                 - Dizziness, a severe headache, or vision loss    Return to the emergency department if:   - You have a headache with neck stiffness and a fever.  - You have a constant headache and are vomiting.  - You have severe pain that does not get better after you take pain medicine.  - You have a headache and the pain worsens when you look into light.  - You have a headache and vision changes, such as blurred vision.  - You have a headache and are forgetful or confused.    Contact your healthcare provider if:   - You have a headache each day that does not get better, even after treatment.  - You have changes in your headaches, or new symptoms that occur when you have a headache.  - Others you live or work with also have headaches.  - You have questions or concerns about your condition or care.    Medicines: You may need any of the following:     Medicines may be given to prevent or treat headache pain. Do not wait until the pain is severe to take your medicine. Ask your healthcare provider how to take the medicine safely.     NSAIDs, such as ibuprofen, help decrease swelling, pain, and fever. This medicine is available with or without a doctor's order. NSAIDs can cause stomach bleeding or kidney problems in certain people. If you take blood thinner medicine, always ask if NSAIDs are safe for you. Always read the medicine label and follow directions. Do not give these medicines to children under 6 months of age without direction from your child's healthcare provider.    Acetaminophen decreases pain and fever. It is available without a doctor's order. Ask how much to take and how often to take it. Follow directions. Read the labels of all other medicines you are using to see if they also contain acetaminophen, or ask your doctor or pharmacist. Acetaminophen can cause liver damage if not taken correctly. Do not use more than 4 grams (4,000 milligrams) total of acetaminophen in one day.     Antinausea medicine may be given to calm your stomach and help prevent vomiting.    Take your medicine as directed. Contact your healthcare provider if you think your medicine is not helping or if you have side effects. Tell him of her if you are allergic to any medicine. Keep a list of the medicines, vitamins, and herbs you take. Include the amounts, and when and why you take them. Bring the list or the pill bottles to follow-up visits. Carry your medicine list with you in case of an emergency.    Manage your symptoms:   - Rest in a dark and quiet room. This may help decrease your pain.  - Apply heat or ice as directed. Heat or ice may help decrease pain or muscle spasms. Apply heat or ice on the area for 20 minutes every 2 hours for as many days as directed. Your healthcare provider may recommend that you alternate heat and ice.  - Relax your muscles to help relieve a headache. Lie down in a comfortable position and close your eyes. Relax your muscles slowly. Start at your toes and work your way up your body. A massage or warm bath may also help relax your muscles.  - Keep a headache record: Record the dates and times that you get headaches, and what you were doing before the headache started. Also record what you ate and drank in the 24 hours before the headache started. This might help your healthcare provider find the cause of your headaches and make a treatment plan. The record can also help you avoid headache triggers or manage your symptoms.  - Get enough sleep: You should get 8 to 10 hours of sleep each night. Create a sleep schedule. Go to bed and wake up at the same times each day. It may be helpful to do something relaxing before bed. Do not watch television right before bed.  - Do not smoke: Nicotine and other chemicals in cigarettes and cigars can trigger a headache or make it worse. Ask your healthcare provider for information if you currently smoke and need help to quit. E-cigarettes or smokeless tobacco still contain nicotine. Talk to your healthcare provider before you use these products.   - Drink liquids as directed: You may need to drink more liquid to prevent dehydration. Dehydration can cause a headache. Ask your healthcare provider how much liquid to drink each day and which liquids are best for you.   - Limit caffeine and alcohol as directed: Your headaches may be triggered by caffeine or alcohol. You may also develop a headache if you drink caffeine regularly and suddenly stop.  - Eat a variety of healthy foods: Do not skip meals. Too little food can trigger a headache. Include fruits, vegetables, whole-grain breads, low-fat dairy products, beans, lean meat, and fish. Do not have trigger foods, such as chocolate and red wine. Foods that contain gluten, nitrates, MSG, or artificial sweeteners may also trigger a headache.  - Follow up with your healthcare provider as directed: Write down your questions so you remember to ask them during your visits.    Urinary Tract Infection    A urinary tract infection (UTI) is an infection of any part of the urinary tract, which includes the kidneys, ureters, bladder, and urethra. Risk factors include ignoring your need to urinate, wiping back to front if female, being an uncircumcised male, and having diabetes or a weak immune system. Symptoms include frequent urination, pain or burning with urination, foul smelling urine, cloudy urine, pain in the lower abdomen, blood in the urine, and fever. If you were prescribed an antibiotic medicine, take it as told by your health care provider. Do not stop taking the antibiotic even if you start to feel better.    SEEK IMMEDIATE MEDICAL CARE IF YOU HAVE ANY OF THE FOLLOWING SYMPTOMS: severe back or abdominal pain, fever, inability to keep fluids or medicine down, dizziness/lightheadedness, or a change in mental status.

## 2025-03-22 NOTE — ED PROVIDER NOTE - ATTENDING CONTRIBUTION TO CARE
61 yo F with hx of HTN, HLD, DM who present with acute onset of severe R sided headache which started 1.5 hrs ago associated with chills and nausea. Brother says pt tried taking motrin but didn't help and started vomiting so came to ED. While in ED, pt defecated on self. No fever,blurry vision, slurred speech, unilateral weakness, numbness, difficulty walking/swallowing, cp, sob, recent head trauma. No blood thinners.    PMD Dr. Acuña    CONSTITUTIONAL: well developed, pt moaning in pain, speaking in full sentences, nontoxic appearing  SKIN: warm, dry, no rash  HEAD: normocephalic, atraumatic  EYES: PERRL at 3mm, EOMI, no conjunctival erythema, no spontaneous nystagmus, no provoked nystagmus   ENT: patent airway, moist mucous membranes, no tongue deviation  NECK: supple, no masses, full flexion/extension without pain  CV:  regular rate, regular rhythm, 2+ radial pulses bilaterally  RESP: no wheezes, no rales, no rhonchi, normal work of breathing  ABD: soft, no tenderness, nondistended, no rebound, no guarding  MSK: no cyanosis, no edema  NEURO: alert, oriented, CN 2-12 grossly intact, sensation intact to light touch symmetrically, 5/5 motor strength in all extremities, no pronator drift, no facial asymmetry,  PSYCH: cooperative, appropriate    A&P:  Pt here with severe headache, vomiting, chills. BP 200s/90s in ED. No focal neuro deficits however exam limited as pt is moaning in pain. Taken emergently to CT to r/o ICH, SAH, aneurysm. 61 yo F with hx of HTN, HLD, DM who present with acute onset of severe L sided headache which started 1.5 hrs ago associated with chills and nausea. Brother says pt tried taking motrin but didn't help and started vomiting so came to ED. While in ED, pt defecated on self. No fever,blurry vision, slurred speech, unilateral weakness, numbness, difficulty walking/swallowing, cp, sob, recent head trauma. No blood thinners.    PMD Dr. Acuña    CONSTITUTIONAL: well developed, pt moaning in pain, speaking in full sentences, nontoxic appearing  SKIN: warm, dry, no rash  HEAD: normocephalic, atraumatic  EYES: PERRL at 2mm, EOMI, no conjunctival erythema, no spontaneous nystagmus, no provoked nystagmus   ENT: patent airway, moist mucous membranes, no tongue deviation  NECK: supple, no masses, full flexion/extension without pain  CV:  regular rate, regular rhythm, 2+ radial pulses bilaterally  RESP: no wheezes, no rales, no rhonchi, normal work of breathing  ABD: soft, no tenderness, nondistended, no rebound, no guarding  MSK: no cyanosis, no edema  NEURO: alert, oriented, CN 2-12 grossly intact, sensation intact to light touch symmetrically, 5/5 motor strength in all extremities, no pronator drift, no facial asymmetry,  PSYCH: cooperative, appropriate    A&P:  Pt here with severe headache, vomiting, chills. BP 200s/90s in ED. No focal neuro deficits however exam limited as pt is moaning in pain. Taken emergently to CT to r/o ICH, SAH, aneurysm.

## 2025-03-23 LAB
ALBUMIN SERPL ELPH-MCNC: 3.5 G/DL — SIGNIFICANT CHANGE UP (ref 3.5–5.2)
ALP SERPL-CCNC: 133 U/L — HIGH (ref 30–115)
ALT FLD-CCNC: 8 U/L — SIGNIFICANT CHANGE UP (ref 0–41)
ANION GAP SERPL CALC-SCNC: 11 MMOL/L — SIGNIFICANT CHANGE UP (ref 7–14)
AST SERPL-CCNC: 10 U/L — SIGNIFICANT CHANGE UP (ref 0–41)
BASOPHILS # BLD AUTO: 0.02 K/UL — SIGNIFICANT CHANGE UP (ref 0–0.2)
BASOPHILS NFR BLD AUTO: 0.2 % — SIGNIFICANT CHANGE UP (ref 0–1)
BILIRUB SERPL-MCNC: 0.6 MG/DL — SIGNIFICANT CHANGE UP (ref 0.2–1.2)
BUN SERPL-MCNC: 34 MG/DL — HIGH (ref 10–20)
CALCIUM SERPL-MCNC: 8.5 MG/DL — SIGNIFICANT CHANGE UP (ref 8.4–10.5)
CHLORIDE SERPL-SCNC: 103 MMOL/L — SIGNIFICANT CHANGE UP (ref 98–110)
CHOLEST SERPL-MCNC: 183 MG/DL — SIGNIFICANT CHANGE UP
CO2 SERPL-SCNC: 25 MMOL/L — SIGNIFICANT CHANGE UP (ref 17–32)
CREAT SERPL-MCNC: 1.4 MG/DL — SIGNIFICANT CHANGE UP (ref 0.7–1.5)
EGFR: 44 ML/MIN/1.73M2 — LOW
EGFR: 44 ML/MIN/1.73M2 — LOW
EOSINOPHIL # BLD AUTO: 0.06 K/UL — SIGNIFICANT CHANGE UP (ref 0–0.7)
EOSINOPHIL NFR BLD AUTO: 0.5 % — SIGNIFICANT CHANGE UP (ref 0–8)
GLUCOSE SERPL-MCNC: 155 MG/DL — HIGH (ref 70–99)
HCT VFR BLD CALC: 32.7 % — LOW (ref 37–47)
HDLC SERPL-MCNC: 45 MG/DL — LOW
HGB BLD-MCNC: 11 G/DL — LOW (ref 12–16)
IMM GRANULOCYTES NFR BLD AUTO: 0.4 % — HIGH (ref 0.1–0.3)
LACTATE SERPL-SCNC: 1.2 MMOL/L — SIGNIFICANT CHANGE UP (ref 0.7–2)
LIPID PNL WITH DIRECT LDL SERPL: 125 MG/DL — HIGH
LYMPHOCYTES # BLD AUTO: 2.6 K/UL — SIGNIFICANT CHANGE UP (ref 1.2–3.4)
LYMPHOCYTES # BLD AUTO: 23.5 % — SIGNIFICANT CHANGE UP (ref 20.5–51.1)
MAGNESIUM SERPL-MCNC: 2.2 MG/DL — SIGNIFICANT CHANGE UP (ref 1.8–2.4)
MCHC RBC-ENTMCNC: 27 PG — SIGNIFICANT CHANGE UP (ref 27–31)
MCHC RBC-ENTMCNC: 33.6 G/DL — SIGNIFICANT CHANGE UP (ref 32–37)
MCV RBC AUTO: 80.3 FL — LOW (ref 81–99)
MONOCYTES # BLD AUTO: 0.92 K/UL — HIGH (ref 0.1–0.6)
MONOCYTES NFR BLD AUTO: 8.3 % — SIGNIFICANT CHANGE UP (ref 1.7–9.3)
NEUTROPHILS # BLD AUTO: 7.44 K/UL — HIGH (ref 1.4–6.5)
NEUTROPHILS NFR BLD AUTO: 67.1 % — SIGNIFICANT CHANGE UP (ref 42.2–75.2)
NON HDL CHOLESTEROL: 138 MG/DL — HIGH
NRBC BLD AUTO-RTO: 0 /100 WBCS — SIGNIFICANT CHANGE UP (ref 0–0)
PHOSPHATE SERPL-MCNC: 4.4 MG/DL — SIGNIFICANT CHANGE UP (ref 2.1–4.9)
PLATELET # BLD AUTO: 315 K/UL — SIGNIFICANT CHANGE UP (ref 130–400)
PMV BLD: 11.6 FL — HIGH (ref 7.4–10.4)
POTASSIUM SERPL-MCNC: 4.4 MMOL/L — SIGNIFICANT CHANGE UP (ref 3.5–5)
POTASSIUM SERPL-SCNC: 4.4 MMOL/L — SIGNIFICANT CHANGE UP (ref 3.5–5)
PROT SERPL-MCNC: 6.6 G/DL — SIGNIFICANT CHANGE UP (ref 6–8)
RBC # BLD: 4.07 M/UL — LOW (ref 4.2–5.4)
RBC # FLD: 13.5 % — SIGNIFICANT CHANGE UP (ref 11.5–14.5)
SODIUM SERPL-SCNC: 139 MMOL/L — SIGNIFICANT CHANGE UP (ref 135–146)
TRIGL SERPL-MCNC: 68 MG/DL — SIGNIFICANT CHANGE UP
WBC # BLD: 11.08 K/UL — HIGH (ref 4.8–10.8)
WBC # FLD AUTO: 11.08 K/UL — HIGH (ref 4.8–10.8)

## 2025-03-23 PROCEDURE — 70544 MR ANGIOGRAPHY HEAD W/O DYE: CPT | Mod: 26,59

## 2025-03-23 PROCEDURE — 95819 EEG AWAKE AND ASLEEP: CPT | Mod: 26

## 2025-03-23 PROCEDURE — 70551 MRI BRAIN STEM W/O DYE: CPT | Mod: 26

## 2025-03-23 PROCEDURE — 99232 SBSQ HOSP IP/OBS MODERATE 35: CPT

## 2025-03-23 RX ORDER — METFORMIN HYDROCHLORIDE 850 MG/1
500 TABLET ORAL
Refills: 0 | Status: DISCONTINUED | OUTPATIENT
Start: 2025-03-23 | End: 2025-03-23

## 2025-03-23 RX ORDER — METFORMIN HYDROCHLORIDE 850 MG/1
500 TABLET ORAL
Refills: 0 | Status: DISCONTINUED | OUTPATIENT
Start: 2025-03-24 | End: 2025-03-24

## 2025-03-23 RX ORDER — SODIUM CHLORIDE 9 G/1000ML
1000 INJECTION, SOLUTION INTRAVENOUS
Refills: 0 | Status: DISCONTINUED | OUTPATIENT
Start: 2025-03-23 | End: 2025-03-24

## 2025-03-23 RX ORDER — AMLODIPINE BESYLATE 10 MG/1
10 TABLET ORAL DAILY
Refills: 0 | Status: DISCONTINUED | OUTPATIENT
Start: 2025-03-24 | End: 2025-03-24

## 2025-03-23 RX ADMIN — INSULIN LISPRO 4: 100 INJECTION, SOLUTION INTRAVENOUS; SUBCUTANEOUS at 18:11

## 2025-03-23 RX ADMIN — INSULIN LISPRO 9 UNIT(S): 100 INJECTION, SOLUTION INTRAVENOUS; SUBCUTANEOUS at 08:36

## 2025-03-23 RX ADMIN — Medication 650 MILLIGRAM(S): at 19:20

## 2025-03-23 RX ADMIN — CEFTRIAXONE 100 MILLIGRAM(S): 500 INJECTION, POWDER, FOR SOLUTION INTRAMUSCULAR; INTRAVENOUS at 05:03

## 2025-03-23 RX ADMIN — AMLODIPINE BESYLATE 5 MILLIGRAM(S): 10 TABLET ORAL at 05:01

## 2025-03-23 RX ADMIN — INSULIN LISPRO 9 UNIT(S): 100 INJECTION, SOLUTION INTRAVENOUS; SUBCUTANEOUS at 18:12

## 2025-03-23 RX ADMIN — INSULIN LISPRO 2: 100 INJECTION, SOLUTION INTRAVENOUS; SUBCUTANEOUS at 13:18

## 2025-03-23 RX ADMIN — INSULIN LISPRO 2: 100 INJECTION, SOLUTION INTRAVENOUS; SUBCUTANEOUS at 08:34

## 2025-03-23 RX ADMIN — INSULIN LISPRO 9 UNIT(S): 100 INJECTION, SOLUTION INTRAVENOUS; SUBCUTANEOUS at 13:19

## 2025-03-23 RX ADMIN — INSULIN LISPRO 6: 100 INJECTION, SOLUTION INTRAVENOUS; SUBCUTANEOUS at 00:05

## 2025-03-23 RX ADMIN — Medication 650 MILLIGRAM(S): at 18:27

## 2025-03-23 RX ADMIN — SODIUM CHLORIDE 100 MILLILITER(S): 9 INJECTION, SOLUTION INTRAVENOUS at 05:33

## 2025-03-23 NOTE — CONSULT NOTE ADULT - ASSESSMENT
ASSESSMENT  61 yo F with a PMH of diabetes and hypertension presenting for worsening headache and chills and an episode of vomiting x1d.    IMPRESSION  #  Pt admitted with HA, /110 on admission- suspected hypertensive urgency   Afebrile  Admission WBC 9     UA WBC 44    CT Head: No CT evidence of acute intracranial pathology.    CXR: No focal consolidation, pneumothorax or pleural effusion. Stable cardiomediastinal silhouette. Unchanged osseous structures.  #Abx allergy: chloroquine (Pruritus)    Creatinine: 1.4 (03-23-25 @ 08:18)      Weight (kg): 117 (03-22-25 @ 09:00)    RECOMMENDATIONS  This is an incomplete consult note. All final recommendations to follow after interview and examination of the patient. Please follow recommendations noted below.    If any questions, please send a message or call on [facility]  Please continue to update ID with any pertinent new laboratory, radiographic findings, or change in clinical status ASSESSMENT  59 yo [place] F with a PMH of diabetes and hypertension presenting for worsening headache and chills and an episode of vomiting x1d.    IMPRESSION  #No evidence of active infection   Pt admitted with HA, /110 on admission- suspected hypertensive urgency   Afebrile  Admission WBC 9     UA WBC 44- asymptomatic pyuria     CT Head: No CT evidence of acute intracranial pathology.    CXR: No focal consolidation, pneumothorax or pleural effusion. Stable cardiomediastinal silhouette. Unchanged osseous structures.  #Abx allergy: chloroquine (Pruritus)    Creatinine: 1.4 (03-23-25 @ 08:18)      Weight (kg): 117 (03-22-25 @ 09:00)    RECOMMENDATIONS  - Monitor off antimicrobials   - Please recall ID PRN. Please inform ID of any patient clinical change or any new pertinent laboratory or radiographic data     If any questions, please send a message or call on [facility]  Please continue to update ID with any pertinent new laboratory, radiographic findings, or change in clinical status

## 2025-03-23 NOTE — CONSULT NOTE ADULT - SUBJECTIVE AND OBJECTIVE BOX
DUSTIN, [facility] [[email or web address]]  60y, Female  Allergy: chloroquine (Pruritus)      CHIEF COMPLAINT:       LOS  1d    HPI  HPI:  Ms. Bosch is a 59 yo F with a PMH of diabetes and hypertension presenting for worsening headache and chills and an episode of vomiting x1d. History obtained from  Christiano. Over last several days patient complained of feeling "cold" throughout the day. Last night they were watching TV and the patient stood up and said she was having a severe headache promoting a visit to the ED. She had an episodes of NBNB vomiting outside of the hospital. In the ED she was found to have defecated on herself. She follows verbal commands on exam, denies urinary symptoms, is not able to provide significant history about events leading to admission. Admitted to medicine for further management.    Vitals  Temp: 97.9 -> 99.4 -> 100  HR: 73 -> 85  BP: 190/110 -> 152/65 -> 124/58  O2: 100% on RA -> 98% on RA  RR: 18 -> 17    Labs  WBC: 9.38  HgB: 12.0  Plt: 331  Cr: 1.2 (baseline)  Trop: 10 -> 13 ->13  FS -> 379  BHB: 0.2  Pro-BNP: 317  Serum Osm: 306  Lactate: 2.5  UA: Grossly positive    Imaging  CT Head: No CT evidence of acute intracranial pathology.    CTA Head and Neck: No significant intracranial vessel stenosis, occlusion or vessel malformation. No carotid or vertebral artery stenosis.    CXR: No focal consolidation, pneumothorax or pleural effusion. Stable cardiomediastinal silhouette. Unchanged osseous structures.    In the ED  1L LR Bolus  Vancomycin 1g IV x1  Ceftriaxone 1g IV x1  Insulin Regular 10u IV x1  Insulin Regular 8u subcutaneous x1  Acetaminophen 975mg PO x1  Ketorolac 15mg IV x2  Magnesium Sulfate 2g IV x1  Metoclopramide 10mg IV x1  Ondansetron 4mg IV x1  Decadron 10mg IV x1    Neurology evaluation recs:  - CT and CTA are normal  - EEG, MRI, MRV and LP if all other workup negative   (22 Mar 2025 10:37)      INFECTIOUS DISEASE HISTORY:  ID consulted for UTI  Pt admitted with HA, /110 on admission  Afebrile  Admission WBC 9     UA WBC 44    Currently ordered for:  cefTRIAXone   IVPB 1000 milliGRAM(s) IV Intermittent every 24 hours      PMH  PAST MEDICAL & SURGICAL HISTORY:  Hypertension  HTN      Diabetes mellitus      HLD (hyperlipidemia)          FAMILY HISTORY      SOCIAL HISTORY  Social History:  denies tobacco, alcohol, illicit drug use (2022 02:16)        ROS  ***    VITALS:  T(F): 97.9, Max: 98.2 (25 @ 18:54)  HR: 64  BP: 155/77  RR: 18Vital Signs Last 24 Hrs  T(C): 36.6 (23 Mar 2025 04:44), Max: 36.8 (22 Mar 2025 18:54)  T(F): 97.9 (23 Mar 2025 04:44), Max: 98.2 (22 Mar 2025 18:54)  HR: 64 (23 Mar 2025 04:44) (64 - 70)  BP: 155/77 (23 Mar 2025 04:44) (106/67 - 155/77)  BP(mean): 80 (22 Mar 2025 16:02) (80 - 80)  RR: 18 (23 Mar 2025 04:44) (18 - 18)  SpO2: 98% (23 Mar 2025 04:44) (98% - 100%)    Parameters below as of 22 Mar 2025 18:54  Patient On (Oxygen Delivery Method): room air        PHYSICAL EXAM:  ***    TESTS & MEASUREMENTS:                        11.0   11.08 )-----------( 315      ( 23 Mar 2025 08:18 )             32.7         139  |  103  |  34[H]  ----------------------------<  155[H]  4.4   |  25  |  1.4    Ca    8.5      23 Mar 2025 08:18  Phos  4.4       Mg     2.2         TPro  6.6  /  Alb  3.5  /  TBili  0.6  /  DBili  x   /  AST  10  /  ALT  8   /  AlkPhos  133[H]  03-23      LIVER FUNCTIONS - ( 23 Mar 2025 08:18 )  Alb: 3.5 g/dL / Pro: 6.6 g/dL / ALK PHOS: 133 U/L / ALT: 8 U/L / AST: 10 U/L / GGT: x           Urinalysis Basic - ( 23 Mar 2025 08:18 )    Color: x / Appearance: x / SG: x / pH: x  Gluc: 155 mg/dL / Ketone: x  / Bili: x / Urobili: x   Blood: x / Protein: x / Nitrite: x   Leuk Esterase: x / RBC: x / WBC x   Sq Epi: x / Non Sq Epi: x / Bacteria: x        Urinalysis with Rflx Culture (collected 25 @ 02:20)        Lactate, Blood: 1.2 mmol/L (25 @ 08:18)  Lactate, Blood: 3.5 mmol/L (25 @ 16:38)  Lactate, Blood: 3.4 mmol/L (25 @ 11:04)  Blood Gas Venous - Lactate: 2.5 mmol/L (25 @ 08:31)      INFECTIOUS DISEASES TESTING      INFLAMMATORY MARKERS      RADIOLOGY & ADDITIONAL TESTS:  I have personally reviewed the last Chest xray  CXR  Xray Chest 1 View- PORTABLE-Urgent:   ACC: [id number] EXAM:  XR CHEST PORTABLE URGENT 1V   ORDERED BY: SULTANA PATTEN     PROCEDURE DATE:  2025          INTERPRETATION:  STUDY INDICATION: Stroke Code    TECHNIQUE:  Portable frontal view of the chest obtained.    COMPARISON: XR CHEST 2022.    FINDINGS/  IMPRESSION:      No focal consolidation, pneumothorax or pleural effusion.    Stable cardiomediastinal silhouette.    Unchanged osseous structures.    --- End of Report ---            KATIE HERNANDEZ MD; Attending Radiologist  This document has been electronically signed. Mar 22 2025  7:50AM (25 @ 03:31)      CT      CARDIOLOGY TESTING  12 Lead ECG:   Ventricular Rate 86 BPM    Atrial Rate 86 BPM    P-R Interval 168 ms <TRUNCATED> (25 @ 05:49)       MEDICATIONS  amLODIPine   Tablet 5 Oral daily  cefTRIAXone   IVPB 1000 IV Intermittent every 24 hours  dextrose 5%. 1000 IV Continuous <Continuous>  dextrose 5%. 1000 IV Continuous <Continuous>  dextrose 50% Injectable 25 IV Push once  dextrose 50% Injectable 12.5 IV Push once  dextrose 50% Injectable 25 IV Push once  enoxaparin Injectable 40 SubCutaneous every 24 hours  glucagon  Injectable 1 IntraMuscular once  influenza   Vaccine 0.5 IntraMuscular once  insulin glargine Injectable (LANTUS) 27 SubCutaneous at bedtime  insulin lispro (ADMELOG) corrective regimen sliding scale  SubCutaneous every 6 hours  insulin lispro Injectable (ADMELOG) 9 SubCutaneous three times a day before meals  lactated ringers. 1000 IV Continuous <Continuous>  senna 2 Oral at bedtime      ANTIBIOTICS:  cefTRIAXone   IVPB 1000 milliGRAM(s) IV Intermittent every 24 hours      ALLERGIES:  chloroquine (Pruritus)           DUSTIN, [facility]  60y, Female  Allergy: chloroquine (Pruritus)      CHIEF COMPLAINT:       LOS  1d    HPI  HPI:  Ms. Bosch is a 61 yo F with a PMH of diabetes and hypertension presenting for worsening headache and chills and an episode of vomiting x1d. History obtained from  Christiano. Over last several days patient complained of feeling "cold" throughout the day. Last night they were watching TV and the patient stood up and said she was having a severe headache promoting a visit to the ED. She had an episodes of NBNB vomiting outside of the hospital. In the ED she was found to have defecated on herself. She follows verbal commands on exam, denies urinary symptoms, is not able to provide significant history about events leading to admission. Admitted to medicine for further management.    Vitals  Temp: 97.9 -> 99.4 -> 100  HR: 73 -> 85  BP: 190/110 -> 152/65 -> 124/58  O2: 100% on RA -> 98% on RA  RR: 18 -> 17    Labs  WBC: 9.38  HgB: 12.0  Plt: 331  Cr: 1.2 (baseline)  Trop: 10 -> 13 ->13  FS -> 379  BHB: 0.2  Pro-BNP: 317  Serum Osm: 306  Lactate: 2.5  UA: Grossly positive    Imaging  CT Head: No CT evidence of acute intracranial pathology.    CTA Head and Neck: No significant intracranial vessel stenosis, occlusion or vessel malformation. No carotid or vertebral artery stenosis.    CXR: No focal consolidation, pneumothorax or pleural effusion. Stable cardiomediastinal silhouette. Unchanged osseous structures.    In the ED  1L LR Bolus  Vancomycin 1g IV x1  Ceftriaxone 1g IV x1  Insulin Regular 10u IV x1  Insulin Regular 8u subcutaneous x1  Acetaminophen 975mg PO x1  Ketorolac 15mg IV x2  Magnesium Sulfate 2g IV x1  Metoclopramide 10mg IV x1  Ondansetron 4mg IV x1  Decadron 10mg IV x1    Neurology evaluation recs:  - CT and CTA are normal  - EEG, MRI, MRV and LP if all other workup negative   (22 Mar 2025 10:37)      INFECTIOUS DISEASE HISTORY:  ID consulted for UTI  denies fever, chills, dysuria  no neck stiffness  HA improving    Pt admitted with HA, /110 on admission  Afebrile  Admission WBC 9     UA WBC 44    Currently ordered for:  cefTRIAXone   IVPB 1000 milliGRAM(s) IV Intermittent every 24 hours      PMH  PAST MEDICAL & SURGICAL HISTORY:  Hypertension  HTN      Diabetes mellitus      HLD (hyperlipidemia)          FAMILY HISTORY      SOCIAL HISTORY  Social History:  denies tobacco, alcohol, illicit drug use (2022 02:16)        ROS  General: Denies rigors, nightsweats  HEENT: as noted above   CV: Denies CP, palpitations  PULM: Denies wheezing, hemoptysis  GI: Denies hematemesis, hematochezia, melena  : Denies discharge, hematuria  MSK: Denies arthralgias, myalgias  SKIN: Denies rash, lesions  NEURO: Denies paresthesias, weakness  PSYCH: Denies depression, anxiety     VITALS:  T(F): 97.9, Max: 98.2 (25 @ 18:54)  HR: 64  BP: 155/77  RR: 18Vital Signs Last 24 Hrs  T(C): 36.6 (23 Mar 2025 04:44), Max: 36.8 (22 Mar 2025 18:54)  T(F): 97.9 (23 Mar 2025 04:44), Max: 98.2 (22 Mar 2025 18:54)  HR: 64 (23 Mar 2025 04:44) (64 - 70)  BP: 155/77 (23 Mar 2025 04:44) (106/67 - 155/77)  BP(mean): 80 (22 Mar 2025 16:02) (80 - 80)  RR: 18 (23 Mar 2025 04:44) (18 - 18)  SpO2: 98% (23 Mar 2025 04:44) (98% - 100%)    Parameters below as of 22 Mar 2025 18:54  Patient On (Oxygen Delivery Method): room air        PHYSICAL EXAM:  Gen: NAD, resting in bed  HEENT: Normocephalic, atraumatic  Neck: supple, no lymphadenopathy, no neck stiffness  CV: Regular rate & regular rhythm  Lungs: decreased BS at bases, no fremitus  Abdomen: Soft, BS present no suprapubic tenderness, no CVAT   Ext: Warm, well perfused  Neuro: non focal, awake  Skin: no rash, no erythema  Lines: no phlebitis     TESTS & MEASUREMENTS:                        11.0   11.08 )-----------( 315      ( 23 Mar 2025 08:18 )             32.7         139  |  103  |  34[H]  ----------------------------<  155[H]  4.4   |  25  |  1.4    Ca    8.5      23 Mar 2025 08:18  Phos  4.4       Mg     2.2         TPro  6.6  /  Alb  3.5  /  TBili  0.6  /  DBili  x   /  AST  10  /  ALT  8   /  AlkPhos  133[H]        LIVER FUNCTIONS - ( 23 Mar 2025 08:18 )  Alb: 3.5 g/dL / Pro: 6.6 g/dL / ALK PHOS: 133 U/L / ALT: 8 U/L / AST: 10 U/L / GGT: x           Urinalysis Basic - ( 23 Mar 2025 08:18 )    Color: x / Appearance: x / SG: x / pH: x  Gluc: 155 mg/dL / Ketone: x  / Bili: x / Urobili: x   Blood: x / Protein: x / Nitrite: x   Leuk Esterase: x / RBC: x / WBC x   Sq Epi: x / Non Sq Epi: x / Bacteria: x        Urinalysis with Rflx Culture (collected 25 @ 02:20)        Lactate, Blood: 1.2 mmol/L (25 @ 08:18)  Lactate, Blood: 3.5 mmol/L (25 @ 16:38)  Lactate, Blood: 3.4 mmol/L (25 @ 11:04)  Blood Gas Venous - Lactate: 2.5 mmol/L (25 @ 08:31)      INFECTIOUS DISEASES TESTING      INFLAMMATORY MARKERS      RADIOLOGY & ADDITIONAL TESTS:  I have personally reviewed the last Chest xray  CXR  Xray Chest 1 View- PORTABLE-Urgent:   ACC: [id number] EXAM:  XR CHEST PORTABLE URGENT 1V   ORDERED BY: SULTANA PATTEN     PROCEDURE DATE:  2025          INTERPRETATION:  STUDY INDICATION: Stroke Code    TECHNIQUE:  Portable frontal view of the chest obtained.    COMPARISON: XR CHEST 2022.    FINDINGS/  IMPRESSION:      No focal consolidation, pneumothorax or pleural effusion.    Stable cardiomediastinal silhouette.    Unchanged osseous structures.    --- End of Report ---            KATIE HERNANDEZ MD; Attending Radiologist  This document has been electronically signed. Mar 22 2025  7:50AM (25 @ 03:31)      CT      CARDIOLOGY TESTING  12 Lead ECG:   Ventricular Rate 86 BPM    Atrial Rate 86 BPM    P-R Interval 168 ms <TRUNCATED> (25 @ 05:49)       MEDICATIONS  amLODIPine   Tablet 5 Oral daily  cefTRIAXone   IVPB 1000 IV Intermittent every 24 hours  dextrose 5%. 1000 IV Continuous <Continuous>  dextrose 5%. 1000 IV Continuous <Continuous>  dextrose 50% Injectable 25 IV Push once  dextrose 50% Injectable 12.5 IV Push once  dextrose 50% Injectable 25 IV Push once  enoxaparin Injectable 40 SubCutaneous every 24 hours  glucagon  Injectable 1 IntraMuscular once  influenza   Vaccine 0.5 IntraMuscular once  insulin glargine Injectable (LANTUS) 27 SubCutaneous at bedtime  insulin lispro (ADMELOG) corrective regimen sliding scale  SubCutaneous every 6 hours  insulin lispro Injectable (ADMELOG) 9 SubCutaneous three times a day before meals  lactated ringers. 1000 IV Continuous <Continuous>  senna 2 Oral at bedtime      ANTIBIOTICS:  cefTRIAXone   IVPB 1000 milliGRAM(s) IV Intermittent every 24 hours      ALLERGIES:  chloroquine (Pruritus)

## 2025-03-23 NOTE — PHARMACOTHERAPY INTERVENTION NOTE - COMMENTS
Recommended discontinuing ceftriaxone as per ID consult recommendations to monitor the patient off of antibiotics. Intervention pending approval.

## 2025-03-23 NOTE — EEG REPORT - NS EEG TEXT BOX
[facility] Neu[facility]  Inpatient Routine-EEG Report      Patient Name:	NORBERTO CHAN    :	1965  MRN:	-  Study Date/Time:	3/22/2025, 11:43:59 AM  Referred by:	-    Brief Clinical History:  NORBERTO CHAN is a 60 year old Female; study performed to investigate for seizures or markers of epilepsy.   Diagnosis Code: R40.4 Transient alteration of awareness    Patient Medication:  INSULIN    TYLENOL    VANTIN    ROCEPHIN    TORADOL    VANCOMYCIN      Acquisition Details:  Electroencephalography was acquired using a minimum of 21 channels on an [employer] system v 9.3.1 with electrode placement according to the standard International 10-20 system following ACNS (American Clinical Neurophysiology Society) guidelines.  Anterior temporal T1 and T2 electrodes were utilized whenever possible.   The [employer] automated spike & seizure detections were all reviewed in detail, in addition to the entire raw EEG.    Findings:  Background:  continuous.   Voltage:  Normal (20uV)  Organization:  Appropriate anterior-posterior gradient  Posterior Dominant Rhythm:  8-8.5 Hz symmetric, well-organized, and well-modulated  Variability:  Yes	Reactivity:  Yes  Sleep:  Symmetric, synchronous spindles and K complexes.  Focal abnormalities:  Rare <1%)  Interictal Activity:  Sharp transients  Location:  Left frontal  Focal Slowing:  Left Hemispheric  Generalized Slowing:  Mild  Events:  1)	No electrographic seizures or significant clinical events.  Provocations:  1)	Hyperventilation: was not performed.  2)	Photic stimulation: was not performed.  Impression:  Abnormal due to generalized slowing as above  Abnormal due to focal slowing as above    Clinical Correlation:  Findings consistent with diffuse electrocerebral dysfunction with suggestion of greater LEFT hemispheric involvement secondary to nonspecific etiology    Williams Joyce MD  Attending Neurologist, Division of Epilepsy

## 2025-03-24 ENCOUNTER — TRANSCRIPTION ENCOUNTER (OUTPATIENT)
Age: 60
End: 2025-03-24

## 2025-03-24 VITALS
HEART RATE: 59 BPM | OXYGEN SATURATION: 99 % | SYSTOLIC BLOOD PRESSURE: 137 MMHG | TEMPERATURE: 97 F | DIASTOLIC BLOOD PRESSURE: 68 MMHG | RESPIRATION RATE: 19 BRPM

## 2025-03-24 LAB
A1C WITH ESTIMATED AVERAGE GLUCOSE RESULT: 9.5 % — HIGH (ref 4–5.6)
ALBUMIN SERPL ELPH-MCNC: 3.4 G/DL — LOW (ref 3.5–5.2)
ALP SERPL-CCNC: 117 U/L — HIGH (ref 30–115)
ALT FLD-CCNC: 8 U/L — SIGNIFICANT CHANGE UP (ref 0–41)
ANION GAP SERPL CALC-SCNC: 9 MMOL/L — SIGNIFICANT CHANGE UP (ref 7–14)
AST SERPL-CCNC: 10 U/L — SIGNIFICANT CHANGE UP (ref 0–41)
BASOPHILS # BLD AUTO: 0.04 K/UL — SIGNIFICANT CHANGE UP (ref 0–0.2)
BASOPHILS NFR BLD AUTO: 0.5 % — SIGNIFICANT CHANGE UP (ref 0–1)
BILIRUB SERPL-MCNC: 0.6 MG/DL — SIGNIFICANT CHANGE UP (ref 0.2–1.2)
BUN SERPL-MCNC: 29 MG/DL — HIGH (ref 10–20)
CALCIUM SERPL-MCNC: 8.5 MG/DL — SIGNIFICANT CHANGE UP (ref 8.4–10.5)
CHLORIDE SERPL-SCNC: 104 MMOL/L — SIGNIFICANT CHANGE UP (ref 98–110)
CO2 SERPL-SCNC: 27 MMOL/L — SIGNIFICANT CHANGE UP (ref 17–32)
CREAT SERPL-MCNC: 1.1 MG/DL — SIGNIFICANT CHANGE UP (ref 0.7–1.5)
EGFR: 58 ML/MIN/1.73M2 — LOW
EGFR: 58 ML/MIN/1.73M2 — LOW
EOSINOPHIL # BLD AUTO: 0.29 K/UL — SIGNIFICANT CHANGE UP (ref 0–0.7)
EOSINOPHIL NFR BLD AUTO: 3.3 % — SIGNIFICANT CHANGE UP (ref 0–8)
ESTIMATED AVERAGE GLUCOSE: 226 MG/DL — HIGH (ref 68–114)
GLUCOSE SERPL-MCNC: 194 MG/DL — HIGH (ref 70–99)
HCT VFR BLD CALC: 33.8 % — LOW (ref 37–47)
HGB BLD-MCNC: 11.1 G/DL — LOW (ref 12–16)
IMM GRANULOCYTES NFR BLD AUTO: 0.2 % — SIGNIFICANT CHANGE UP (ref 0.1–0.3)
LYMPHOCYTES # BLD AUTO: 4.09 K/UL — HIGH (ref 1.2–3.4)
LYMPHOCYTES # BLD AUTO: 46.1 % — SIGNIFICANT CHANGE UP (ref 20.5–51.1)
MAGNESIUM SERPL-MCNC: 1.8 MG/DL — SIGNIFICANT CHANGE UP (ref 1.8–2.4)
MCHC RBC-ENTMCNC: 27.1 PG — SIGNIFICANT CHANGE UP (ref 27–31)
MCHC RBC-ENTMCNC: 32.8 G/DL — SIGNIFICANT CHANGE UP (ref 32–37)
MCV RBC AUTO: 82.6 FL — SIGNIFICANT CHANGE UP (ref 81–99)
MONOCYTES # BLD AUTO: 0.73 K/UL — HIGH (ref 0.1–0.6)
MONOCYTES NFR BLD AUTO: 8.2 % — SIGNIFICANT CHANGE UP (ref 1.7–9.3)
NEUTROPHILS # BLD AUTO: 3.71 K/UL — SIGNIFICANT CHANGE UP (ref 1.4–6.5)
NEUTROPHILS NFR BLD AUTO: 41.7 % — LOW (ref 42.2–75.2)
NRBC BLD AUTO-RTO: 0 /100 WBCS — SIGNIFICANT CHANGE UP (ref 0–0)
PLATELET # BLD AUTO: 286 K/UL — SIGNIFICANT CHANGE UP (ref 130–400)
PMV BLD: 11.1 FL — HIGH (ref 7.4–10.4)
POTASSIUM SERPL-MCNC: 4.2 MMOL/L — SIGNIFICANT CHANGE UP (ref 3.5–5)
POTASSIUM SERPL-SCNC: 4.2 MMOL/L — SIGNIFICANT CHANGE UP (ref 3.5–5)
PROT SERPL-MCNC: 6.3 G/DL — SIGNIFICANT CHANGE UP (ref 6–8)
RBC # BLD: 4.09 M/UL — LOW (ref 4.2–5.4)
RBC # FLD: 14 % — SIGNIFICANT CHANGE UP (ref 11.5–14.5)
SODIUM SERPL-SCNC: 140 MMOL/L — SIGNIFICANT CHANGE UP (ref 135–146)
T4 AB SER-ACNC: 6.4 UG/DL — SIGNIFICANT CHANGE UP (ref 4.6–12)
TSH SERPL-MCNC: 1.1 UIU/ML — SIGNIFICANT CHANGE UP (ref 0.27–4.2)
WBC # BLD: 8.88 K/UL — SIGNIFICANT CHANGE UP (ref 4.8–10.8)
WBC # FLD AUTO: 8.88 K/UL — SIGNIFICANT CHANGE UP (ref 4.8–10.8)

## 2025-03-24 PROCEDURE — 99232 SBSQ HOSP IP/OBS MODERATE 35: CPT

## 2025-03-24 PROCEDURE — 99239 HOSP IP/OBS DSCHRG MGMT >30: CPT

## 2025-03-24 RX ORDER — AMLODIPINE BESYLATE 10 MG/1
1 TABLET ORAL
Qty: 30 | Refills: 0
Start: 2025-03-24 | End: 2025-04-22

## 2025-03-24 RX ADMIN — Medication 2 TABLET(S): at 01:17

## 2025-03-24 RX ADMIN — INSULIN GLARGINE-YFGN 27 UNIT(S): 100 INJECTION, SOLUTION SUBCUTANEOUS at 00:07

## 2025-03-24 RX ADMIN — AMLODIPINE BESYLATE 10 MILLIGRAM(S): 10 TABLET ORAL at 05:30

## 2025-03-24 RX ADMIN — INSULIN LISPRO 2: 100 INJECTION, SOLUTION INTRAVENOUS; SUBCUTANEOUS at 00:08

## 2025-03-24 RX ADMIN — METFORMIN HYDROCHLORIDE 500 MILLIGRAM(S): 850 TABLET ORAL at 08:25

## 2025-03-24 RX ADMIN — Medication 650 MILLIGRAM(S): at 13:34

## 2025-03-24 RX ADMIN — INSULIN LISPRO 9 UNIT(S): 100 INJECTION, SOLUTION INTRAVENOUS; SUBCUTANEOUS at 12:12

## 2025-03-24 RX ADMIN — Medication 650 MILLIGRAM(S): at 12:34

## 2025-03-24 RX ADMIN — INSULIN LISPRO 2: 100 INJECTION, SOLUTION INTRAVENOUS; SUBCUTANEOUS at 12:13

## 2025-03-24 NOTE — DISCHARGE NOTE PROVIDER - PROVIDER TOKENS
PROVIDER:[TOKEN:[[id number]],FOLLOWUP:[1 week]] PROVIDER:[TOKEN:[[id number]],FOLLOWUP:[1 week]],PROVIDER:[TOKEN:[[id number]],FOLLOWUP:[1 week]]

## 2025-03-24 NOTE — DISCHARGE NOTE PROVIDER - NSDCCPCAREPLAN_GEN_ALL_CORE_FT
PRINCIPAL DISCHARGE DIAGNOSIS  Diagnosis: Hypertensive emergency  Assessment and Plan of Treatment: You came to the hospital with a severe headache and high blood pressure of 190/110.  We monitored your blood pressure and provided medication to help lower it. Imaging did not show any specific findings. The EEG testing did not show any seizure activity. You were seen by both the infecitous disease and the neurology specialists. The infectious disease doctor recommended to monitor off antibiotics. Neurology   It's crucial that you continue monitoring your blood pressure at home regularly as instructed.  Please take your prescribed medications exactly as directed, even if you start feeling better.  Do not stop taking them without first consulting your doctor.  Avoid over-the-counter medications that could raise your blood pressure, such as decongestants.  Manage stress through techniques like deep breathing or meditation.  Maintain a healthy lifestyle including regular exercise and a balanced diet low in sodium.  Limit alcohol intake and avoid smoking.  It’s important to follow up with your primary care doctor as scheduled and to return to the hospital immediately if your headache worsens, your vision changes, you experience numbness or weakness, or your blood pressure remains elevated despite taking your medication.         SECONDARY DISCHARGE DIAGNOSES  Diagnosis: UTI (urinary tract infection)  Assessment and Plan of Treatment:     Diagnosis: AMS (altered mental status)  Assessment and Plan of Treatment:     Diagnosis: Hyperglycemia  Assessment and Plan of Treatment:      PRINCIPAL DISCHARGE DIAGNOSIS  Diagnosis: Hypertensive emergency  Assessment and Plan of Treatment: You came to the hospital with a severe headache and high blood pressure of 190/110.  We monitored your blood pressure and provided medication to help lower it. Imaging did not show any specific findings. The EEG testing did not show any seizure activity. You were seen by both the infecitous disease and the neurology specialists. The infectious disease doctor recommended to monitor off antibiotics. Neurology did not find any suspicious etiology either. This headache may be due to a migraine or high blood pressure.  It's crucial that you continue monitoring your blood pressure at home regularly as instructed.  Please take your prescribed medications exactly as directed, even if you start feeling better.  Do not stop taking them without first consulting your doctor.  Avoid over-the-counter medications that could raise your blood pressure, such as decongestants.  Manage stress through techniques like deep breathing or meditation.  Maintain a healthy lifestyle including regular exercise and a balanced diet low in sodium.  Limit alcohol intake and avoid smoking.  It’s important to follow up with your primary care doctor as scheduled and to return to the hospital immediately if your headache worsens, your vision changes, you experience numbness or weakness, or your blood pressure remains elevated despite taking your medication.         SECONDARY DISCHARGE DIAGNOSES  Diagnosis: UTI (urinary tract infection)  Assessment and Plan of Treatment:     Diagnosis: AMS (altered mental status)  Assessment and Plan of Treatment:     Diagnosis: Hyperglycemia  Assessment and Plan of Treatment:

## 2025-03-24 NOTE — PROGRESS NOTE ADULT - ASSESSMENT
61yo F with a PMH of HTN and DM admitted for further workup of hypoactive alerted mental status in the setting of recent sharp headache and episode of vomiting.    #Hypoactive Altered Mental Status possibly due to Toxic Metabolic Encephalopathy from Cystitis vs. Seizure Episode vs. TIA vs. PRES  #Lactic Acidosis  #Hyperglycemia  - Patient stated she felt cold over last 3d per   - Sharp headache yesterday evening with episode of vomiting  - Found to have defecated herself in ED  - No sepsis on admission  - UA grossly positive, FSG >400, BHB 0.2  - CT Head and CTA Head and Neck negative  - Seen by Neurology in ED  - Hold all home oral hypoglycemics  - A1c 9.1 in 2022  - Received 10u IV Insulin  - Start basal bolus 27/9 TID/Sliding Scale +2 (weight is 117kg)  - Obtain EEG, MR and MRV Head per Neuro  - Re-eval'd patient at 5:00PM now AOx3, eating, stating she does not have headache currently  - C/w Ceftriaxone 1g IV  - Monitor lactate, c/w IVF  - ID eval  - Neuro f/u    #HTN  #CKD IIIa  - C/w Amlodipine 5mg QD    #HLD  - ?Unsure if takes Atorvastatin  - Lipid panel    #Misc  #Code Status: Full Code  #DVT ppx: Lovenox  #GI ppx: N/A  #Diet: CC/DASH  #Activity: Ambulate as Tolerated  #Inpatient Dispo: F  #Discharge Dispo: From home    
60-year-old female past medical history of diabetes, hypertension, dyslipidemia presenting for acute onset of severe L sided headache which started 1.5 hrs prior to admission associated with chills and nausea with episode of vomiting and fecal incontinence iso heavy vomiting/pressure w/o LOC or change in mental status.  Headache is severe on L side, received reglan, zofran, toradol, decadron in ED and this seemed to help, as patient is now wanting to sleep and endorsing improved HA but still present; Exam nonfocal.  Urinalysis suggestive of UTI but patient with no urinary symptoms.  MR Head (-) for acute findings, shows chronic microvascular changes. MRV (-) for dural venous sinus thrombosis. Etiology may be due to migraine.     Recommendations:  - If headache continues can consider the following:   - Toradol   - Metoclopramide 10mg IV + Benadryl 25mg   - IV Magnesium   - IV Fluid Bolus  - If no relief Dexamethasone 10mg IV

## 2025-03-24 NOTE — DISCHARGE NOTE NURSING/CASE MANAGEMENT/SOCIAL WORK - NSDCPEFALRISK_GEN_ALL_CORE
For information on Fall & Injury Prevention, visit: [email or web address] OR  [email or web address] OR  https://www.cdc.gov/steadi/patient.html

## 2025-03-24 NOTE — DISCHARGE NOTE PROVIDER - CARE PROVIDER_API CALL
Luis Acuña  Internal Medicine  [address] [place] [address], Admin - Room [address] [place] [address]  Phone: [phone]  Fax: [phone]  Follow Up Time: 1 week   Luis Acuña  Internal Medicine  [address] [facility], Admin - Room 6  [place] [address]  Phone: [phone]  Fax: [phone]  Follow Up Time: 1 week    Thaddeus Gibbs  Neurology  [address] [place] [address], [address]  [place] [address]  Phone: [phone]  Fax: [phone]  Follow Up Time: 1 week

## 2025-03-24 NOTE — DISCHARGE NOTE PROVIDER - NSDCMRMEDTOKEN_GEN_ALL_CORE_FT
amLODIPine 5 mg oral tablet: 1 tab(s) orally once a day  Crestor 10 mg oral tablet: 1 tab(s) orally once a day  glimepiride 1 mg oral tablet: 2 tab(s) orally once a day  metFORMIN 1000 mg oral tablet: 1 tab(s) orally 2 times a day   amLODIPine 10 mg oral tablet: 1 tab(s) orally once a day  Crestor 10 mg oral tablet: 1 tab(s) orally once a day  glimepiride 1 mg oral tablet: 2 tab(s) orally once a day  metFORMIN 1000 mg oral tablet: 1 tab(s) orally 2 times a day

## 2025-03-24 NOTE — DISCHARGE NOTE PROVIDER - HOSPITAL COURSE
HPI: Ms. Bosch is a 59 yo F with a PMH of diabetes and hypertension presenting for worsening headache and chills and an episode of vomiting x1d. History obtained from  [facility]. Over last several days patient complained of feeling "cold" throughout the day. Last night they were watching TV and the patient stood up and said she was having a severe headache promoting a visit to the ED. She had an episodes of NBNB vomiting outside of the hospital. In the ED she was found to have defecated on herself. She follows verbal commands on exam, denies urinary symptoms, is not able to provide significant history about events leading to admission. Admitted to medicine for further management.    Vitals  Temp: 97.9 -> 99.4 -> 100  HR: 73 -> 85  BP: 190/110 -> 152/65 -> 124/58  O2: 100% on RA -> 98% on RA  RR: 18 -> 17    Hospital summary: 61yo F with a PMH of HTN and DM admitted for further workup of hypoactive alerted mental status in the setting of recent sharp headache and episode of vomiting. Patients labs were only remarkable for elevated lactate 3.5 which trended down to 1.2, UA was grossly positive. ID saw the patient and recommended to monitor off abx. Neurology was consulted, pt underwent CT and CTA of head/neck which were both unremarkable. They recommended MRI and MRV of the head. which showed ________   EEG was unremarkable as well.    #Hypoactive Altered Mental Status possibly due to Toxic Metabolic Encephalopathy from Cystitis vs. Seizure Episode vs. TIA vs. PRES  #Lactic Acidosis  #Hyperglycemia  - Patient stated she felt cold over last 3d per   - Sharp headache yesterday evening with episode of vomiting  - Found to have defecated herself in ED  - No sepsis on admission  - UA grossly positive, FSG >400, BHB 0.2  - CT Head and CTA Head and Neck negative  - Seen by Neurology in ED  - Hold all home oral hypoglycemics  - A1c 9.1 in 2022  - Received 10u IV Insulin  - Start basal bolus 27/9 TID/Sliding Scale +2 (weight is 117kg)  - Obtain EEG, MR and MRV Head per Neuro  - Re-eval'd patient at 5:00PM now AOx3, eating, stating she does not have headache currently  - C/w Ceftriaxone 1g IV  - Monitor lactate, c/w IVF  - ID eval  - Neuro f/u    #HTN  #CKD IIIa  - C/w Amlodipine 5mg QD    #HLD  - ?Unsure if takes Atorvastatin  - Lipid panel HPI: Ms. Bosch is a 59 yo F with a PMH of diabetes and hypertension presenting for worsening headache and chills and an episode of vomiting x1d. History obtained from  [facility]. Over last several days patient complained of feeling "cold" throughout the day. Last night they were watching TV and the patient stood up and said she was having a severe headache promoting a visit to the ED. She had an episodes of NBNB vomiting outside of the hospital. In the ED she was found to have defecated on herself. She follows verbal commands on exam, denies urinary symptoms, is not able to provide significant history about events leading to admission. Admitted to medicine for further management.    Vitals  Temp: 97.9 -> 99.4 -> 100  HR: 73 -> 85  BP: 190/110 -> 152/65 -> 124/58  O2: 100% on RA -> 98% on RA  RR: 18 -> 17    Hospital summary: 61yo F with a PMH of HTN and DM admitted for further workup of hypoactive alerted mental status in the setting of recent sharp headache and episode of vomiting. Patients labs were only remarkable for elevated lactate 3.5 which trended down to 1.2, UA was grossly positive. ID saw the patient and recommended to monitor off abx. Neurology was consulted, pt underwent CT and CTA of head/neck which were both unremarkable. They recommended MRI and MRV of the head. which showed no acute intracranial pathology or abnormal enhancement. EEG was unremarkable as well. Pt will follow up with neurology as OP. Etiology may be due to migraine.   Pt is now resting, headache has resolved.    #Severe headache  #Hypertensive urgency  - Sharp headache yesterday evening with episode of vomiting  - UA grossly positive, FSG >400, BHB 0.2  - CT Head and CTA Head and Neck negative  - Seen by Neurology in ED  - CT, CTA head/neck, EEG, MRI, MRV all unremarkable  - Re-eval'd patient at 5:00PM now AOx3, eating, stating she does not have headache currently  - ID recs appreciated  - Neuro recs appreciated     #HTN  #CKD IIIa  - C/w Amlodipine 5mg QD    #HLD  - Lipid panel noted. F/u OP HPI: Ms. Bosch is a 59 yo F with a PMH of diabetes and hypertension presenting for worsening headache and chills and an episode of vomiting x1d. History obtained from  [facility]. Over last several days patient complained of feeling "cold" throughout the day. Last night they were watching TV and the patient stood up and said she was having a severe headache promoting a visit to the ED. She had an episodes of NBNB vomiting outside of the hospital. In the ED she was found to have defecated on herself. She follows verbal commands on exam, denies urinary symptoms, is not able to provide significant history about events leading to admission. Admitted to medicine for further management.    Vitals  Temp: 97.9 -> 99.4 -> 100  HR: 73 -> 85  BP: 190/110 -> 152/65 -> 124/58  O2: 100% on RA -> 98% on RA  RR: 18 -> 17    Hospital summary: 61yo F with a PMH of HTN and DM admitted for further workup of hypoactive alerted mental status in the setting of recent sharp headache and episode of vomiting. Patients labs were only remarkable for elevated lactate 3.5 which trended down to 1.2, UA was grossly positive. ID saw the patient and recommended to monitor off abx. Neurology was consulted, pt underwent CT and CTA of head/neck which were both unremarkable. They recommended MRI and MRV of the head. which showed no acute intracranial pathology or abnormal enhancement. EEG was unremarkable as well. Pt will follow up with neurology as OP. Etiology may be due to migraine.   Pt is now resting, headache has resolved.    #Severe headache  #Hypertensive urgency  - Sharp headache yesterday evening with episode of vomiting  - UA grossly positive, FSG >400, BHB 0.2  - CT Head and CTA Head and Neck negative  - Seen by Neurology in ED  - CT, CTA head/neck, EEG, MRI, MRV all unremarkable  - Re-eval'd patient at 5:00PM now AOx3, eating, stating she does not have headache currently  - ID recs appreciated  - Neuro recs appreciated     #HTN  #CKD IIIa  - C/w Amlodipine 10 mg QD    #HLD  - Lipid panel noted. F/u OP

## 2025-03-24 NOTE — DISCHARGE NOTE PROVIDER - CARE PROVIDERS DIRECT ADDRESSES
,[email or web address]

## 2025-03-24 NOTE — DISCHARGE NOTE NURSING/CASE MANAGEMENT/SOCIAL WORK - NSDCFUADDAPPT_GEN_ALL_CORE_FT
APPTS ARE READY TO BE MADE: [ ] YES    Best Family or Patient Contact (if needed):    Additional Information about above appointments (if needed):    1: IM: Post hospital follow up  2: Neurology: F/u for possible migraine    Other comments or requests:

## 2025-03-24 NOTE — PROGRESS NOTE ADULT - SUBJECTIVE AND OBJECTIVE BOX
Neurology Progress Note     NORBERTO CHAN 60y Female [id number]    Hospital Day: 2d     Overnight events:  None    Subjective complaints:  Pt evaluated at bedside. Pt reports her headache returned this morning and is suspicious it is related to the turkey sausage as she has never eaten this before. Headache is again left sided pounding pain, denies nausea at this time.    Vital Signs  T(F): 98.2 (04:55), Max: 98.2 (04:55)  HR: 56 (04:55) (54 - 62)  BP: 150/79 (04:55) (117/66 - 150/79)  RR: 18 (04:55) (18 - 18)  SpO2: 96% (04:55) (96% - 100%)    Neurological Exam:   Mental status: Awake, alert and oriented to person, place, and time. Naming, repetition and comprehension intact.  Attention/concentration intact.  No dysarthria, no aphasia.       Cranial nerves:   - Eyes: PERRL, EOMI without nystagmus, Visual fields full   - Face: BL V1-V3 sensation intact symmetrically, face symmetric   - ENT: Hearing intact to voice   Motor: 5/5 HENRRY&LE. Normal tone and bulk.  No abnormal movements.    Sensation: Intact to light touch   Coordination: No dysmetria on finger-to-nose and heel-to-shin.  No dysdiadokinesia.  Reflexes: 2+ in bilateral UE/LE   Gait: Deferred      Labs:  WBC 8.88 /HGB 11.1 /MCV 82.6 /HCT 33.8 / / 03-24  WBC 11.08 /HGB 11.0 /MCV 80.3 /HCT 32.7 / / 03-23  WBC 11.02 /HGB 11.6 /MCV 80.2 /HCT 35.2 / / 03-22    03-23    139  |  103  |  34[H]  ----------------------------<  155[H]  4.4   |  25  |  1.4    Ca    8.5      23 Mar 2025 08:18  Phos  4.4     03-23  Mg     2.2     03-23    TPro  6.6  /  Alb  3.5  /  TBili  0.6  /  DBili  x   /  AST  10  /  ALT  8   /  AlkPhos  133[H]  03-23    LIVER FUNCTIONS - ( 23 Mar 2025 08:18 )  Alb: 3.5 g/dL / Pro: 6.6 g/dL / ALK PHOS: 133 U/L / ALT: 8 U/L / AST: 10 U/L / GGT: x             Urinalysis Basic - ( 23 Mar 2025 08:18 )    Color: x / Appearance: x / SG: x / pH: x  Gluc: 155 mg/dL / Ketone: x  / Bili: x / Urobili: x   Blood: x / Protein: x / Nitrite: x   Leuk Esterase: x / RBC: x / WBC x   Sq Epi: x / Non Sq Epi: x / Bacteria: x        Medications:  acetaminophen     Tablet .. 650 milliGRAM(s) Oral every 6 hours PRN  amLODIPine   Tablet 10 milliGRAM(s) Oral daily  dextrose 5%. 1000 milliLiter(s) IV Continuous <Continuous>  dextrose 5%. 1000 milliLiter(s) IV Continuous <Continuous>  dextrose 50% Injectable 25 Gram(s) IV Push once  dextrose 50% Injectable 12.5 Gram(s) IV Push once  dextrose 50% Injectable 25 Gram(s) IV Push once  dextrose Oral Gel 15 Gram(s) Oral once PRN  enoxaparin Injectable 40 milliGRAM(s) SubCutaneous every 24 hours  glucagon  Injectable 1 milliGRAM(s) IntraMuscular once  influenza   Vaccine 0.5 milliLiter(s) IntraMuscular once  insulin glargine Injectable (LANTUS) 27 Unit(s) SubCutaneous at bedtime  insulin lispro (ADMELOG) corrective regimen sliding scale   SubCutaneous every 6 hours  insulin lispro Injectable (ADMELOG) 9 Unit(s) SubCutaneous three times a day before meals  lactated ringers. 1000 milliLiter(s) IV Continuous <Continuous>  metFORMIN 500 milliGRAM(s) Oral two times a day  polyethylene glycol 3350 17 Gram(s) Oral daily PRN  senna 2 Tablet(s) Oral at bedtime      Neuroimaging:      PERTINENT HISTORICAL RESULTS:
Patient is a 60y old Female who presents with a chief complaint of   Today is hospital day     Overnight events/Interval History:  - No acute overnight events    ALLERGIES:  chloroquine (Pruritus)    MEDICATIONS:  STANDING MEDICATIONS  amLODIPine   Tablet 5 milliGRAM(s) Oral daily  cefTRIAXone   IVPB 1000 milliGRAM(s) IV Intermittent every 24 hours  dextrose 5%. 1000 milliLiter(s) IV Continuous <Continuous>  dextrose 5%. 1000 milliLiter(s) IV Continuous <Continuous>  dextrose 50% Injectable 25 Gram(s) IV Push once  dextrose 50% Injectable 12.5 Gram(s) IV Push once  dextrose 50% Injectable 25 Gram(s) IV Push once  enoxaparin Injectable 40 milliGRAM(s) SubCutaneous every 24 hours  glucagon  Injectable 1 milliGRAM(s) IntraMuscular once  influenza   Vaccine 0.5 milliLiter(s) IntraMuscular once  insulin glargine Injectable (LANTUS) 27 Unit(s) SubCutaneous at bedtime  insulin lispro (ADMELOG) corrective regimen sliding scale   SubCutaneous every 6 hours  insulin lispro Injectable (ADMELOG) 9 Unit(s) SubCutaneous three times a day before meals  lactated ringers. 1000 milliLiter(s) IV Continuous <Continuous>  senna 2 Tablet(s) Oral at bedtime    PRN MEDICATIONS  acetaminophen     Tablet .. 650 milliGRAM(s) Oral every 6 hours PRN  dextrose Oral Gel 15 Gram(s) Oral once PRN  polyethylene glycol 3350 17 Gram(s) Oral daily PRN      VITALS LAST 24H:  Vital Signs Last 24 Hrs  T(C): 36.8 (22 Mar 2025 18:54), Max: 37.8 (22 Mar 2025 09:00)  T(F): 98.2 (22 Mar 2025 18:54), Max: 100 (22 Mar 2025 09:00)  HR: 68 (22 Mar 2025 18:54) (68 - 87)  BP: 126/69 (22 Mar 2025 18:54) (106/67 - 190/110)  BP(mean): 80 (22 Mar 2025 16:02) (80 - 94)  RR: 18 (22 Mar 2025 18:54) (17 - 18)  SpO2: 100% (22 Mar 2025 18:54) (98% - 100%)    Parameters below as of 22 Mar 2025 18:54  Patient On (Oxygen Delivery Method): room air      LABS:                        11.6   11.02 )-----------( 348      ( 22 Mar 2025 16:38 )             35.2     03-22    131[L]  |  96[L]  |  28[H]  ----------------------------<  194[H]  4.6   |  22  |  1.4    Ca    9.2      22 Mar 2025 16:38  Phos  3.3     03-22  Mg     2.1     03-22    TPro  7.4  /  Alb  3.8  /  TBili  0.5  /  DBili  x   /  AST  11  /  ALT  11  /  AlkPhos  148[H]  03-22    PT/INR - ( 22 Mar 2025 01:35 )   PT: 10.50 sec;   INR: 0.89 ratio         PTT - ( 22 Mar 2025 01:35 )  PTT:28.2 sec  Urinalysis Basic - ( 22 Mar 2025 16:38 )    Color: x / Appearance: x / SG: x / pH: x  Gluc: 194 mg/dL / Ketone: x  / Bili: x / Urobili: x   Blood: x / Protein: x / Nitrite: x   Leuk Esterase: x / RBC: x / WBC x   Sq Epi: x / Non Sq Epi: x / Bacteria: x        Lactate, Blood: 3.5 mmol/L *H* (03-22-25 @ 16:38)  Lactate, Blood: 3.4 mmol/L *H* (03-22-25 @ 11:04)      Urinalysis with Rflx Culture (collected 22 Mar 2025 02:20)          RADIOLOGY:  CXR  Xray Chest 1 View- PORTABLE-Urgent:   ACC: [id number] EXAM:  XR CHEST PORTABLE URGENT 1V   ORDERED BY: SULTANA PATTEN     PROCEDURE DATE:  03/22/2025          INTERPRETATION:  STUDY INDICATION: Stroke Code    TECHNIQUE:  Portable frontal view of the chest obtained.    COMPARISON: XR CHEST 11/2/2022.    FINDINGS/  IMPRESSION:      No focal consolidation, pneumothorax or pleural effusion.    Stable cardiomediastinal silhouette.    Unchanged osseous structures.    --- End of Report ---            KATIE HERNANDEZ MD; Attending Radiologist  This document has been electronically signed. Mar 22 2025  7:50AM (03-22-25 @ 03:31)      CT

## 2025-03-24 NOTE — DISCHARGE NOTE PROVIDER - NSDCFUADDAPPT_GEN_ALL_CORE_FT
APPTS ARE READY TO BE MADE: [ ] YES    Best Family or Patient Contact (if needed):    Additional Information about above appointments (if needed):    1: IM: Post hospital follow up  2:   3:     Other comments or requests:    APPTS ARE READY TO BE MADE: [ ] YES    Best Family or Patient Contact (if needed):    Additional Information about above appointments (if needed):    1: IM: Post hospital follow up  2: Neurology: F/u for possible migraine    Other comments or requests:

## 2025-03-24 NOTE — DISCHARGE NOTE PROVIDER - NSDCFUSCHEDAPPT_GEN_ALL_CORE_FT
Jorgito Cole  [facility] PreAdmits  Scheduled Appointment: 04/25/2025    Lydia Tuttle  [facility]  [address]  Scheduled Appointment: 04/25/2025    Willy Araujo  [facility] PreAdmits  Scheduled Appointment: 06/16/2025    Willy Araujo  [facility]  [address]  Scheduled Appointment: 06/16/2025

## 2025-03-24 NOTE — DISCHARGE NOTE NURSING/CASE MANAGEMENT/SOCIAL WORK - FINANCIAL ASSISTANCE
[facility] provides services at a reduced cost to those who are determined to be eligible through [facility]’s financial assistance program. Information regarding [facility]’s financial assistance program can be found by going to [email or web address] or by calling [phone].

## 2025-03-24 NOTE — DISCHARGE NOTE PROVIDER - ATTENDING DISCHARGE PHYSICAL EXAMINATION:
T(C): 36.3 (03-24-25 @ 13:27), Max: 36.8 (03-24-25 @ 04:55)  HR: 59 (03-24-25 @ 13:27) (56 - 62)  BP: 137/68 (03-24-25 @ 13:27) (136/63 - 150/79)  RR: 19 (03-24-25 @ 13:27) (18 - 19)  SpO2: 99% (03-24-25 @ 13:27) (96% - 99%)    CONSTITUTIONAL: Well groomed, no apparent distress  EYES: PERRLA and symmetric, EOMI   RESP: No respiratory distress, no use of accessory muscles; CTA b/l  CV: RRR, +S1S2, no MRG; no JVD; no peripheral edema  GI: Soft, NT, ND, no rebound, no guarding  MSK: Normal gait; No edema on b/l LE  NEURO: CN II-XII intact; no focal deficit  PSYCH: Appropriate insight/judgment; A+O x 3, mood and affect appropriate

## 2025-03-24 NOTE — DISCHARGE NOTE NURSING/CASE MANAGEMENT/SOCIAL WORK - PATIENT PORTAL LINK FT
You can access the FollowMyHealth Patient Portal offered by [facility] by registering at the following website: [email or web address]. By joining [facility]’s FollowMyHealth portal, you will also be able to view your health information using other applications (apps) compatible with our system.

## 2025-03-27 ENCOUNTER — INPATIENT (INPATIENT)
Facility: HOSPITAL | Age: 60
LOS: 3 days | Discharge: HOME | DRG: 111 | End: 2025-03-31
Attending: STUDENT IN AN ORGANIZED HEALTH CARE EDUCATION/TRAINING PROGRAM | Admitting: INTERNAL MEDICINE
Payer: MEDICAID

## 2025-03-27 VITALS
HEART RATE: 77 BPM | WEIGHT: 262.57 LBS | OXYGEN SATURATION: 99 % | DIASTOLIC BLOOD PRESSURE: 79 MMHG | RESPIRATION RATE: 18 BRPM | TEMPERATURE: 99 F | SYSTOLIC BLOOD PRESSURE: 179 MMHG

## 2025-03-27 DIAGNOSIS — N30.90 CYSTITIS, UNSPECIFIED WITHOUT HEMATURIA: ICD-10-CM

## 2025-03-27 DIAGNOSIS — I16.0 HYPERTENSIVE URGENCY: ICD-10-CM

## 2025-03-27 DIAGNOSIS — E87.20 ACIDOSIS, UNSPECIFIED: ICD-10-CM

## 2025-03-27 DIAGNOSIS — I12.9 HYPERTENSIVE CHRONIC KIDNEY DISEASE WITH STAGE 1 THROUGH STAGE 4 CHRONIC KIDNEY DISEASE, OR UNSPECIFIED CHRONIC KIDNEY DISEASE: ICD-10-CM

## 2025-03-27 DIAGNOSIS — G43.909 MIGRAINE, UNSPECIFIED, NOT INTRACTABLE, WITHOUT STATUS MIGRAINOSUS: ICD-10-CM

## 2025-03-27 DIAGNOSIS — R42 DIZZINESS AND GIDDINESS: ICD-10-CM

## 2025-03-27 DIAGNOSIS — G92.8 OTHER TOXIC ENCEPHALOPATHY: ICD-10-CM

## 2025-03-27 DIAGNOSIS — E11.22 TYPE 2 DIABETES MELLITUS WITH DIABETIC CHRONIC KIDNEY DISEASE: ICD-10-CM

## 2025-03-27 DIAGNOSIS — E11.65 TYPE 2 DIABETES MELLITUS WITH HYPERGLYCEMIA: ICD-10-CM

## 2025-03-27 DIAGNOSIS — E78.5 HYPERLIPIDEMIA, UNSPECIFIED: ICD-10-CM

## 2025-03-27 DIAGNOSIS — Z79.84 LONG TERM (CURRENT) USE OF ORAL HYPOGLYCEMIC DRUGS: ICD-10-CM

## 2025-03-27 LAB
ALBUMIN SERPL ELPH-MCNC: 4 G/DL — SIGNIFICANT CHANGE UP (ref 3.5–5.2)
ALP SERPL-CCNC: 135 U/L — HIGH (ref 30–115)
ALT FLD-CCNC: 15 U/L — SIGNIFICANT CHANGE UP (ref 0–41)
ANION GAP SERPL CALC-SCNC: 16 MMOL/L — HIGH (ref 7–14)
APPEARANCE UR: CLEAR — SIGNIFICANT CHANGE UP
APTT BLD: 28.4 SEC — SIGNIFICANT CHANGE UP (ref 27–39.2)
AST SERPL-CCNC: 19 U/L — SIGNIFICANT CHANGE UP (ref 0–41)
B-OH-BUTYR SERPL-SCNC: 0.4 MMOL/L — SIGNIFICANT CHANGE UP
BACTERIA # UR AUTO: ABNORMAL /HPF
BASE EXCESS BLDV CALC-SCNC: -2.4 MMOL/L — LOW (ref -2–3)
BASOPHILS # BLD AUTO: 0.03 K/UL — SIGNIFICANT CHANGE UP (ref 0–0.2)
BASOPHILS NFR BLD AUTO: 0.3 % — SIGNIFICANT CHANGE UP (ref 0–1)
BILIRUB SERPL-MCNC: 0.9 MG/DL — SIGNIFICANT CHANGE UP (ref 0.2–1.2)
BILIRUB UR-MCNC: NEGATIVE — SIGNIFICANT CHANGE UP
BUN SERPL-MCNC: 23 MG/DL — HIGH (ref 10–20)
CA-I SERPL-SCNC: 1.04 MMOL/L — LOW (ref 1.15–1.33)
CALCIUM SERPL-MCNC: 9 MG/DL — SIGNIFICANT CHANGE UP (ref 8.4–10.5)
CHLORIDE SERPL-SCNC: 99 MMOL/L — SIGNIFICANT CHANGE UP (ref 98–110)
CO2 SERPL-SCNC: 21 MMOL/L — SIGNIFICANT CHANGE UP (ref 17–32)
COLOR SPEC: YELLOW — SIGNIFICANT CHANGE UP
CREAT SERPL-MCNC: 1.3 MG/DL — SIGNIFICANT CHANGE UP (ref 0.7–1.5)
CULTURE RESULTS: SIGNIFICANT CHANGE UP
CULTURE RESULTS: SIGNIFICANT CHANGE UP
DIFF PNL FLD: NEGATIVE — SIGNIFICANT CHANGE UP
EGFR: 48 ML/MIN/1.73M2 — LOW
EGFR: 48 ML/MIN/1.73M2 — LOW
EOSINOPHIL # BLD AUTO: 0.24 K/UL — SIGNIFICANT CHANGE UP (ref 0–0.7)
EOSINOPHIL NFR BLD AUTO: 2.2 % — SIGNIFICANT CHANGE UP (ref 0–8)
EPI CELLS # UR: PRESENT
GAS PNL BLDV: 121 MMOL/L — LOW (ref 136–145)
GAS PNL BLDV: SIGNIFICANT CHANGE UP
GAS PNL BLDV: SIGNIFICANT CHANGE UP
GLUCOSE BLDC GLUCOMTR-MCNC: 294 MG/DL — HIGH (ref 70–99)
GLUCOSE SERPL-MCNC: 202 MG/DL — HIGH (ref 70–99)
GLUCOSE UR QL: 250 MG/DL
HCO3 BLDV-SCNC: 21 MMOL/L — LOW (ref 22–29)
HCT VFR BLD CALC: 34.9 % — LOW (ref 37–47)
HCT VFR BLDA CALC: 31 % — LOW (ref 34.5–46.5)
HGB BLD CALC-MCNC: 10.3 G/DL — LOW (ref 11.7–16.1)
HGB BLD-MCNC: 11.8 G/DL — LOW (ref 12–16)
IMM GRANULOCYTES NFR BLD AUTO: 0.5 % — HIGH (ref 0.1–0.3)
INR BLD: 0.88 RATIO — SIGNIFICANT CHANGE UP (ref 0.65–1.3)
KETONES UR-MCNC: 15 MG/DL
LACTATE BLDV-MCNC: 2.1 MMOL/L — HIGH (ref 0.5–2)
LACTATE SERPL-SCNC: 1.6 MMOL/L — SIGNIFICANT CHANGE UP (ref 0.7–2)
LEUKOCYTE ESTERASE UR-ACNC: NEGATIVE — SIGNIFICANT CHANGE UP
LYMPHOCYTES # BLD AUTO: 2.87 K/UL — SIGNIFICANT CHANGE UP (ref 1.2–3.4)
LYMPHOCYTES # BLD AUTO: 26.7 % — SIGNIFICANT CHANGE UP (ref 20.5–51.1)
MCHC RBC-ENTMCNC: 26.8 PG — LOW (ref 27–31)
MCHC RBC-ENTMCNC: 33.8 G/DL — SIGNIFICANT CHANGE UP (ref 32–37)
MCV RBC AUTO: 79.3 FL — LOW (ref 81–99)
MONOCYTES # BLD AUTO: 0.85 K/UL — HIGH (ref 0.1–0.6)
MONOCYTES NFR BLD AUTO: 7.9 % — SIGNIFICANT CHANGE UP (ref 1.7–9.3)
NEUTROPHILS # BLD AUTO: 6.7 K/UL — HIGH (ref 1.4–6.5)
NEUTROPHILS NFR BLD AUTO: 62.4 % — SIGNIFICANT CHANGE UP (ref 42.2–75.2)
NITRITE UR-MCNC: NEGATIVE — SIGNIFICANT CHANGE UP
NRBC BLD AUTO-RTO: 0 /100 WBCS — SIGNIFICANT CHANGE UP (ref 0–0)
PCO2 BLDV: 29 MMHG — LOW (ref 39–42)
PH BLDV: 7.46 — HIGH (ref 7.32–7.43)
PH UR: 8 — SIGNIFICANT CHANGE UP (ref 5–8)
PLATELET # BLD AUTO: 369 K/UL — SIGNIFICANT CHANGE UP (ref 130–400)
PMV BLD: 10.9 FL — HIGH (ref 7.4–10.4)
PO2 BLDV: 48 MMHG — HIGH (ref 25–45)
POTASSIUM BLDV-SCNC: 3.8 MMOL/L — SIGNIFICANT CHANGE UP (ref 3.5–5.1)
POTASSIUM SERPL-MCNC: 4.5 MMOL/L — SIGNIFICANT CHANGE UP (ref 3.5–5)
POTASSIUM SERPL-SCNC: 4.5 MMOL/L — SIGNIFICANT CHANGE UP (ref 3.5–5)
PROT SERPL-MCNC: 7.5 G/DL — SIGNIFICANT CHANGE UP (ref 6–8)
PROT UR-MCNC: 30 MG/DL
PROTHROM AB SERPL-ACNC: 10.4 SEC — SIGNIFICANT CHANGE UP (ref 9.95–12.87)
RBC # BLD: 4.4 M/UL — SIGNIFICANT CHANGE UP (ref 4.2–5.4)
RBC # FLD: 14 % — SIGNIFICANT CHANGE UP (ref 11.5–14.5)
RBC CASTS # UR COMP ASSIST: 1 /HPF — SIGNIFICANT CHANGE UP (ref 0–4)
SAO2 % BLDV: 87 % — SIGNIFICANT CHANGE UP (ref 67–88)
SODIUM SERPL-SCNC: 136 MMOL/L — SIGNIFICANT CHANGE UP (ref 135–146)
SP GR SPEC: >1.03 — HIGH (ref 1–1.03)
SPECIMEN SOURCE: SIGNIFICANT CHANGE UP
SPECIMEN SOURCE: SIGNIFICANT CHANGE UP
TROPONIN T, HIGH SENSITIVITY RESULT: 14 NG/L — HIGH (ref 6–13)
TROPONIN T, HIGH SENSITIVITY RESULT: 8 NG/L — SIGNIFICANT CHANGE UP (ref 6–13)
UROBILINOGEN FLD QL: 0.2 MG/DL — SIGNIFICANT CHANGE UP (ref 0.2–1)
WBC # BLD: 10.74 K/UL — SIGNIFICANT CHANGE UP (ref 4.8–10.8)
WBC # FLD AUTO: 10.74 K/UL — SIGNIFICANT CHANGE UP (ref 4.8–10.8)
WBC UR QL: 1 /HPF — SIGNIFICANT CHANGE UP (ref 0–5)

## 2025-03-27 PROCEDURE — 80048 BASIC METABOLIC PNL TOTAL CA: CPT

## 2025-03-27 PROCEDURE — 93010 ELECTROCARDIOGRAM REPORT: CPT | Mod: 76

## 2025-03-27 PROCEDURE — 83540 ASSAY OF IRON: CPT

## 2025-03-27 PROCEDURE — 76775 US EXAM ABDO BACK WALL LIM: CPT

## 2025-03-27 PROCEDURE — 83020 HEMOGLOBIN ELECTROPHORESIS: CPT | Mod: 26

## 2025-03-27 PROCEDURE — 99291 CRITICAL CARE FIRST HOUR: CPT

## 2025-03-27 PROCEDURE — 36415 COLL VENOUS BLD VENIPUNCTURE: CPT

## 2025-03-27 PROCEDURE — 82962 GLUCOSE BLOOD TEST: CPT

## 2025-03-27 PROCEDURE — 84484 ASSAY OF TROPONIN QUANT: CPT

## 2025-03-27 PROCEDURE — 83605 ASSAY OF LACTIC ACID: CPT

## 2025-03-27 PROCEDURE — 80053 COMPREHEN METABOLIC PANEL: CPT

## 2025-03-27 PROCEDURE — 84100 ASSAY OF PHOSPHORUS: CPT

## 2025-03-27 PROCEDURE — 97165 OT EVAL LOW COMPLEX 30 MIN: CPT | Mod: GO

## 2025-03-27 PROCEDURE — 70450 CT HEAD/BRAIN W/O DYE: CPT | Mod: 26

## 2025-03-27 PROCEDURE — 84155 ASSAY OF PROTEIN SERUM: CPT

## 2025-03-27 PROCEDURE — 82746 ASSAY OF FOLIC ACID SERUM: CPT

## 2025-03-27 PROCEDURE — 70498 CT ANGIOGRAPHY NECK: CPT | Mod: 26

## 2025-03-27 PROCEDURE — 85660 RBC SICKLE CELL TEST: CPT

## 2025-03-27 PROCEDURE — 85025 COMPLETE CBC W/AUTO DIFF WBC: CPT

## 2025-03-27 PROCEDURE — 99222 1ST HOSP IP/OBS MODERATE 55: CPT

## 2025-03-27 PROCEDURE — 86334 IMMUNOFIX E-PHORESIS SERUM: CPT

## 2025-03-27 PROCEDURE — 83550 IRON BINDING TEST: CPT

## 2025-03-27 PROCEDURE — 85027 COMPLETE CBC AUTOMATED: CPT

## 2025-03-27 PROCEDURE — 87040 BLOOD CULTURE FOR BACTERIA: CPT

## 2025-03-27 PROCEDURE — 86038 ANTINUCLEAR ANTIBODIES: CPT

## 2025-03-27 PROCEDURE — 70496 CT ANGIOGRAPHY HEAD: CPT | Mod: 26

## 2025-03-27 PROCEDURE — 83615 LACTATE (LD) (LDH) ENZYME: CPT

## 2025-03-27 PROCEDURE — 83020 HEMOGLOBIN ELECTROPHORESIS: CPT

## 2025-03-27 PROCEDURE — 85045 AUTOMATED RETICULOCYTE COUNT: CPT

## 2025-03-27 PROCEDURE — 83735 ASSAY OF MAGNESIUM: CPT

## 2025-03-27 PROCEDURE — 0042T: CPT

## 2025-03-27 PROCEDURE — 82607 VITAMIN B-12: CPT

## 2025-03-27 PROCEDURE — 83010 ASSAY OF HAPTOGLOBIN QUANT: CPT

## 2025-03-27 PROCEDURE — 84165 PROTEIN E-PHORESIS SERUM: CPT

## 2025-03-27 PROCEDURE — 82728 ASSAY OF FERRITIN: CPT

## 2025-03-27 RX ORDER — ONDANSETRON HCL/PF 4 MG/2 ML
4 VIAL (ML) INJECTION EVERY 8 HOURS
Refills: 0 | Status: DISCONTINUED | OUTPATIENT
Start: 2025-03-27 | End: 2025-03-31

## 2025-03-27 RX ORDER — ACETAMINOPHEN 500 MG/5ML
650 LIQUID (ML) ORAL EVERY 6 HOURS
Refills: 0 | Status: DISCONTINUED | OUTPATIENT
Start: 2025-03-27 | End: 2025-03-31

## 2025-03-27 RX ORDER — INSULIN LISPRO 100 U/ML
INJECTION, SOLUTION INTRAVENOUS; SUBCUTANEOUS
Refills: 0 | Status: DISCONTINUED | OUTPATIENT
Start: 2025-03-27 | End: 2025-03-31

## 2025-03-27 RX ORDER — METOCLOPRAMIDE HCL 10 MG
10 TABLET ORAL ONCE
Refills: 0 | Status: COMPLETED | OUTPATIENT
Start: 2025-03-27 | End: 2025-03-27

## 2025-03-27 RX ORDER — INSULIN LISPRO 100 U/ML
8 INJECTION, SOLUTION INTRAVENOUS; SUBCUTANEOUS
Refills: 0 | Status: DISCONTINUED | OUTPATIENT
Start: 2025-03-28 | End: 2025-03-31

## 2025-03-27 RX ORDER — INFLUENZA A VIRUS A/IDAHO/07/2018 (H1N1) ANTIGEN (MDCK CELL DERIVED, PROPIOLACTONE INACTIVATED, INFLUENZA A VIRUS A/INDIANA/08/2018 (H3N2) ANTIGEN (MDCK CELL DERIVED, PROPIOLACTONE INACTIVATED), INFLUENZA B VIRUS B/SINGAPORE/INFTT-16-0610/2016 ANTIGEN (MDCK CELL DERIVED, PROPIOLACTONE INACTIVATED), INFLUENZA B VIRUS B/IOWA/06/2017 ANTIGEN (MDCK CELL DERIVED, PROPIOLACTONE INACTIVATED) 15; 15; 15; 15 UG/.5ML; UG/.5ML; UG/.5ML; UG/.5ML
0.5 INJECTION, SUSPENSION INTRAMUSCULAR ONCE
Refills: 0 | Status: DISCONTINUED | OUTPATIENT
Start: 2025-03-27 | End: 2025-03-31

## 2025-03-27 RX ORDER — HEPARIN SODIUM 1000 [USP'U]/ML
5000 INJECTION INTRAVENOUS; SUBCUTANEOUS EVERY 8 HOURS
Refills: 0 | Status: DISCONTINUED | OUTPATIENT
Start: 2025-03-27 | End: 2025-03-31

## 2025-03-27 RX ORDER — AMLODIPINE BESYLATE 10 MG/1
10 TABLET ORAL DAILY
Refills: 0 | Status: DISCONTINUED | OUTPATIENT
Start: 2025-03-28 | End: 2025-03-31

## 2025-03-27 RX ORDER — HYDROMORPHONE/SOD CHLOR,ISO/PF 2 MG/10 ML
0.5 SYRINGE (ML) INJECTION ONCE
Refills: 0 | Status: DISCONTINUED | OUTPATIENT
Start: 2025-03-27 | End: 2025-03-27

## 2025-03-27 RX ORDER — LABETALOL HYDROCHLORIDE 200 MG/1
10 TABLET, FILM COATED ORAL ONCE
Refills: 0 | Status: DISCONTINUED | OUTPATIENT
Start: 2025-03-27 | End: 2025-03-27

## 2025-03-27 RX ORDER — MAGNESIUM SULFATE 500 MG/ML
2 SYRINGE (ML) INJECTION ONCE
Refills: 0 | Status: COMPLETED | OUTPATIENT
Start: 2025-03-27 | End: 2025-03-27

## 2025-03-27 RX ORDER — INSULIN GLARGINE-YFGN 100 [IU]/ML
15 INJECTION, SOLUTION SUBCUTANEOUS AT BEDTIME
Refills: 0 | Status: DISCONTINUED | OUTPATIENT
Start: 2025-03-27 | End: 2025-03-31

## 2025-03-27 RX ORDER — ROSUVASTATIN CALCIUM 5 MG/1
10 TABLET, FILM COATED ORAL AT BEDTIME
Refills: 0 | Status: DISCONTINUED | OUTPATIENT
Start: 2025-03-27 | End: 2025-03-31

## 2025-03-27 RX ORDER — MAGNESIUM, ALUMINUM HYDROXIDE 200-200 MG
30 TABLET,CHEWABLE ORAL EVERY 4 HOURS
Refills: 0 | Status: DISCONTINUED | OUTPATIENT
Start: 2025-03-27 | End: 2025-03-31

## 2025-03-27 RX ADMIN — Medication 650 MILLIGRAM(S): at 22:20

## 2025-03-27 RX ADMIN — Medication 650 MILLIGRAM(S): at 22:50

## 2025-03-27 RX ADMIN — Medication 25 GRAM(S): at 17:50

## 2025-03-27 RX ADMIN — Medication 104 MILLIGRAM(S): at 17:49

## 2025-03-27 RX ADMIN — Medication 0.5 MILLIGRAM(S): at 18:04

## 2025-03-27 RX ADMIN — HEPARIN SODIUM 5000 UNIT(S): 1000 INJECTION INTRAVENOUS; SUBCUTANEOUS at 22:21

## 2025-03-27 RX ADMIN — ROSUVASTATIN CALCIUM 10 MILLIGRAM(S): 5 TABLET, FILM COATED ORAL at 22:19

## 2025-03-27 RX ADMIN — INSULIN GLARGINE-YFGN 15 UNIT(S): 100 INJECTION, SOLUTION SUBCUTANEOUS at 22:28

## 2025-03-27 NOTE — ED PROVIDER NOTE - IV ALTEPLASE EXCL REL HIDDEN
Contacted patient and spoke with her regarding referral to Dr. Moreno for shortness of breath, trcuspid insufficiency, and possible pulmonary hypertension. Pt has a h/o CAD, CABG in her 40's. She began having shortness of breath a while ago, a stress test was done and RHC (2015) which she reported was normal. In August of 2015, she had an echo which showed moderate-severe tricuspid insufficiency, with a PASP of 60.      More recently, she underwent another echo, EVE. The echo showed the tricuspid insufficiency had improved. EVE was done which did not show ASD, but a small PFO. She states she was informed of this.     Patient feels her shortness of breath could be from her weight, which is about 265. However, she has not recently gained a lot of weight and previously she had not been short of breath. We reviewed all her test results which she was aware of. We discussed the reason for the referral.   
show

## 2025-03-27 NOTE — H&P ADULT - NSHPPHYSICALEXAM_GEN_ALL_CORE
VITALS:   T(C): 37.1 (03-27-25 @ 20:18), Max: 37.1 (03-27-25 @ 17:00)  HR: 72 (03-27-25 @ 20:18) (71 - 77)  BP: 143/77 (03-27-25 @ 20:18) (121/80 - 179/79)  RR: 18 (03-27-25 @ 20:18) (16 - 18)  SpO2: 100% (03-27-25 @ 20:18) (97% - 100%)    GENERAL: NAD, lying in bed comfortably and pleasant, has to be encouraged to answer questions  HEAD:  Atraumatic, Normocephalic  EYES: EOMI, conjunctiva and sclera clear  ENT: Moist mucous membranes  NECK: Supple, No JVD  CHEST/LUNG: CTA b/l;  Unlabored respirations  HEART: Regular rate and rhythm; No murmurs  ABDOMEN: obese, S present; Soft, Nontender, Nondistended  EXTREMITIES:  No clubbing, cyanosis, or edema  NERVOUS SYSTEM:  Alert & Oriented X3, speech clear. No deficits   MSK: FROM all 4 extremities, full and equal strength  SKIN: No rashes or lesions

## 2025-03-27 NOTE — H&P ADULT - ASSESSMENT
60yoF PMHx obesity, DM, HTN, HLD, recent admission for hypertensive emergency BP on arrival 170s/70s, returning to the ED today for sudden onset severe headache, dizziness, nausea, vomiting at 3pm when she woke up.    # Dizziness  # HA  - Code stroke called  - pt states symptoms are improved in the ED  - All radiographic reports show no acute findings  - Admit to tele  - troponin 60yoF PMHx obesity, DM, HTN, HLD, CKD 3A, recent admission for hypertensive emergency BP on arrival 170s/70s, returning to the ED today for sudden onset severe headache, dizziness, nausea, vomiting at 3pm when she woke up.    # Dizziness  # HA  # HTN   - Code stroke called  - pt states symptoms are improved in the ED  - All radiographic reports show no acute findings  - Last admission on 3/22, pt was Seen by Neurology, CT, CTA head/neck, EEG, MRI, MRV all unremarkable  - Admit to tele  - troponin 14-->?  - repeat trop  - repeat am EKG  - BP improved  - will observe at this time after discussing case with Dr Vieira  - monitor BP  - DASH diet  - repeat am labs    # DM  - hold home meds  - FS monitoring  - ISS    # HLD  - c/w statin   60yoF PMHx obesity, DM, HTN, HLD, CKD 3A, recent admission for hypertensive emergency BP on arrival 170s/70s, returning to the ED today for sudden onset severe headache, dizziness, nausea, vomiting at 3pm when she woke up.    # Dizziness  # HA  # HTN   - Code stroke called  - pt states symptoms are improved in the ED  - All radiographic reports show no acute findings  - Last admission on 3/22, pt was Seen by Neurology, CT, CTA head/neck, EEG, MRI, MRV all unremarkable  - Admit to tele  - troponin 14-->?  - repeat trop  - repeat am EKG  - BP improved  - will observe at this time after discussing case with Dr Vieira  - monitor BP  - DASH diet  - repeat am labs    # Possible sepsis  - LA 2.1, elevated BP  - repeat , will f/u  - blood cultures STAT  - sepsis bolus contraindicated at this time due to elevated BP, will see what repeat lactate is and consider, IVF bolus and IVAB     # DM  - hold home meds  - FS monitoring  - ISS    # HLD  - c/w statin

## 2025-03-27 NOTE — ED PROVIDER NOTE - ATTENDING CONTRIBUTION TO CARE
I have personally performed a history and physical exam on this patient and personally directed the management of the patient. Patient is a 52-year-old female presents for evaluation of headache dizziness nausea vomiting resuming today at 3 PM however patient had similar symptoms 72 hours prior to arrival seen and evaluated at [facility] improved presents for reevaluation of symptoms denies any fevers chills diaphoresis denies difficulty walking    On physical exam patient is normocephalic atraumatic pupils equal round reactive light accommodation extraocular muscles intact oropharynx clear chest clear to auscultation bilaterally abdomen soft nontender bowel sounds positive no guarding or rebound extremities patient is speaking in full sentences able to answer questions ANO x 3 at a normal rate despite having significant pain she has full range of motion of her extremities sensation intact no dysmetria noted    Assessment plan patient presents for evaluation of headache hypertension tension nausea vomiting we initiated a stroke old patient had CT head CTA head and neck as well as perfusion study patient secondary to the time course onset of original symptoms 72 hours prior not a candidate for TNK not a candidate for neurointervention at this point nevertheless we discussed case with attending physician did not telestroke Dr. Suggs who agrees with plan of care advises admission hypertension control patient is afebrile is actually improving here this is not consistent with an essential infection or encephalitis at this point patient given antihypertensives PB improved I will admit reobtain EKG per my independent evaluation not consistent with STEMI not consistent with QT prolongation consistent with arrhythmia patient improved at this point given the fact that the patient remains symptomatic this is the second visit for similar symptoms I will admit for further evaluation patient was updated and agrees with plan of care

## 2025-03-27 NOTE — H&P ADULT - NSHPLABSRESULTS_GEN_ALL_CORE
LABS:  cret                        11.8   10.74 )-----------( 369      ( 27 Mar 2025 17:25 )             34.9     03-27    136  |  99  |  23[H]  ----------------------------<  202[H]  4.5   |  21  |  1.3    Ca    9.0      27 Mar 2025 17:25    TPro  7.5  /  Alb  4.0  /  TBili  0.9  /  DBili  x   /  AST  19  /  ALT  15  /  AlkPhos  135[H]  03-27    PT/INR - ( 27 Mar 2025 17:25 )   PT: 10.40 sec;   INR: 0.88 ratio         PTT - ( 27 Mar 2025 17:25 )  PTT:28.4 sec    RADIOLOGY:    CT Brain Perfusion Maps Stroke (03.27.25 @ 17:24)     IMPRESSION:    CT PERFUSION:  Motion limited exam. No perfusion deficits to suggest areas of completed   infarction or at risk territory.    CTA HEAD/NECK:  No large vessel occlusion, aneurysm, or vascular malformation.    Stable focal severe stenosis in the origin of the inferior division left   MCA.

## 2025-03-27 NOTE — ED PROVIDER NOTE - PHYSICAL EXAMINATION
CONSTITUTIONAL: Awake, moaning, complaining of pain and nausea  SKIN: Warm, dry  HEAD: NCAT  EYES: Clear conjunctiva   ENT: MMM  NECK: Supple  CARD: RRR, S1, S2; no M/R/G  RESP: Normal respiratory effort, CTAB  ABD: Soft, nontender. No rebound, rigidity, or guarding  EXT: Pulses palpable distally, no lower extremity swelling  NEURO: Awake, no dysarthria. CN 2-12 grossly intact. No facial droop. moving all extremities equally

## 2025-03-27 NOTE — ED PROVIDER NOTE - OBJECTIVE STATEMENT
60yoF PMHx DM, HTN, recent admission for hypertensive emergency, returning to the ED for sudden onset severe headache, dizziness, nausea, vomiting at 3pm when she woke up. Pt not answering other questions. Code stroke called 60yoF PMHx DM, HTN, recent admission for hypertensive emergency, returning to the ED for sudden onset severe headache, dizziness, nausea, vomiting at 3pm when she woke up. She reports she went to sleep earlier today, but does not know what time. Pt not answering other questions. Code stroke called 60yoF PMHx DM, HTN, recent admission for hypertensive emergency, returning to the ED for sudden onset severe headache, dizziness, nausea, vomiting at 3pm when she woke up. She reports she went to sleep earlier today, but does not know what time. She reports taking her hypertensive medications this morning. Pt not answering other questions. BP on arrival 170s/70s. Code stroke called

## 2025-03-27 NOTE — ED PROVIDER NOTE - PROGRESS NOTE DETAILS
Code stroke called, JOSÉ-- pt back from CT, moving all extremities, still complaining of severe headache JOSÉ-- On reeval, pt still complaining of headache, refusing to answer further questions. spoke with patient's , who reports she was feeling headache and cold last night, This morning he saw her at about 10am before he left and she was acting fine. Then she called him at 3am complaining of a headache again and he told her to go to the hospital.

## 2025-03-27 NOTE — PATIENT PROFILE ADULT - FUNCTIONAL ASSESSMENT - BASIC MOBILITY 6.
3-calculated by average/Not able to assess (calculate score using [facility] averaging method)

## 2025-03-27 NOTE — H&P ADULT - HISTORY OF PRESENT ILLNESS
60yoF PMHx obesity, DM, HTN, HLD, recent admission for hypertensive emergency BP on arrival 170s/70s, returning to the ED today for sudden onset severe headache, dizziness, nausea, vomiting at 3pm when she woke up. She reports she went to sleep earlier today, but does not know what time. ED spoke with patient's , who reports she was feeling headache and cold last night, This morning he saw her at about 10am before he left and she was acting fine. Then she called him at 3pm complaining of a headache again and he told her to go to the hospital. patient had similar symptoms 72 hours prior to arrival seen and evaluated at [facility] improved.She reports taking her hypertensive medications this morning. Currently, pt states HA and dizziness symptoms are improved in the ED currently laying in bed comfortably and relaxed.  denies any fevers chills diaphoresis denies difficulty walking, CP, SOB       60yoF PMHx obesity, DM, HTN, HLD, CKD 3A, recent admission for hypertensive emergency BP on arrival 170s/70s, returning to the ED today for sudden onset severe headache, dizziness, nausea, vomiting at 3pm when she woke up. She reports she went to sleep earlier today, but does not know what time. ED spoke with patient's , who reports she was feeling headache and cold last night, This morning he saw her at about 10am before he left and she was acting fine. Then she called him at 3pm complaining of a headache again and he told her to go to the hospital. patient had similar symptoms 72 hours prior to arrival seen and evaluated at [facility] improved. She reports taking her hypertensive medications this morning. Currently, pt states HA and dizziness symptoms are improved in the ED currently laying in bed comfortably and relaxed.  denies any fevers chills diaphoresis denies difficulty walking, CP, SOB  Pt is AA+O x3

## 2025-03-27 NOTE — ED PROVIDER NOTE - CLINICAL SUMMARY MEDICAL DECISION MAKING FREE TEXT BOX
Patient is a 52-year-old female presents for evaluation of headache dizziness nausea vomiting resuming today at 3 PM however patient had similar symptoms 72 hours prior to arrival seen and evaluated at [facility] improved presents for reevaluation of symptoms denies any fevers chills diaphoresis denies difficulty walking    On physical exam patient is normocephalic atraumatic pupils equal round reactive light accommodation extraocular muscles intact oropharynx clear chest clear to auscultation bilaterally abdomen soft nontender bowel sounds positive no guarding or rebound extremities patient is speaking in full sentences able to answer questions ANO x 3 at a normal rate despite having significant pain she has full range of motion of her extremities sensation intact no dysmetria noted    Assessment plan patient presents for evaluation of headache hypertension tension nausea vomiting we initiated a stroke old patient had CT head CTA head and neck as well as perfusion study patient secondary to the time course onset of original symptoms 72 hours prior not a candidate for TNK not a candidate for neurointervention at this point nevertheless we discussed case with attending physician did not telestroke Dr. Suggs who agrees with plan of care advises admission hypertension control patient is afebrile is actually improving here this is not consistent with an essential infection or encephalitis at this point patient given antihypertensives PB improved I will admit reobtain EKG per my independent evaluation not consistent with STEMI not consistent with QT prolongation consistent with arrhythmia patient improved at this point given the fact that the patient remains symptomatic this is the second visit for similar symptoms I will admit for further evaluation patient was updated and agrees with plan of care

## 2025-03-27 NOTE — H&P ADULT - NSICDXPASTMEDICALHX_GEN_ALL_CORE_FT
PAST MEDICAL HISTORY:  Diabetes mellitus     H/O: obesity     HLD (hyperlipidemia)     Hypertension HTN     PAST MEDICAL HISTORY:  Diabetes mellitus     H/O: obesity     HLD (hyperlipidemia)     Hypertension HTN    Stage 3 chronic kidney disease

## 2025-03-27 NOTE — H&P ADULT - NS ATTEND AMEND GEN_ALL_CORE FT
Seen at bedside soon after arrival to medical floor, very sleepy but is seen at times to be more alert, even talking on the phone. Will observe overnight but if not more alert by morning, consider neuro consult. Old records reviewed Seen at bedside soon after arrival to medical floor, very sleepy but is seen at times to be more alert, even talking on the phone. Will observe overnight but if not more alert by morning, consider neuro consult, possible psychiatric component as well. Old records reviewed

## 2025-03-27 NOTE — ED PROVIDER NOTE - AVIAN FLU SYMPTOMS
UROLOGY DISCHARGE SUMMARY NOTE    Postop day 1 left robotic assisted partial nephrectomy    Subjective   Patient feels fairly comfortable.  Pain control is adequate.  Has ambulated.  Tolerating regular diet.     Objective   Appears comfortable sitting in chair.      Current Facility-Administered Medications   Medication Dose Route Frequency Provider Last Rate Last Admin   • lactated ringers bolus 500 mL  500 mL Intravenous PRN No Lynn MD       • ondansetron (ZOFRAN ODT) disintegrating tablet 4 mg  4 mg Translingual 4 times per day Rico Dalal MD   4 mg at 10/17/23 0533   • acetaminophen (TYLENOL) tablet 1,000 mg  1,000 mg Oral 3 times per day Rico Dalal MD   1,000 mg at 10/17/23 1338   • gabapentin (NEURONTIN) capsule 300 mg  300 mg Oral 3 times per day Rico Dalal MD   300 mg at 10/17/23 1338   • HYDROcodone-acetaminophen (NORCO) 5-325 MG per tablet 1 tablet  1 tablet Oral Q4H PRN Rico Dalal MD   1 tablet at 10/16/23 1637    Or   • HYDROcodone-acetaminophen (NORCO)  MG per tablet 1 tablet  1 tablet Oral Q4H PRN Rico Dalal MD       • morphine injection 2 mg  2 mg Intravenous Q2H PRN Rico Dalal MD       • ondansetron (ZOFRAN ODT) disintegrating tablet 4 mg  4 mg Oral Q12H PRN Rico Dalal MD        Or   • ondansetron (ZOFRAN) injection 4 mg  4 mg Intravenous Q12H PRN Rcio Dalal MD       • docusate sodium-sennosides (SENOKOT S) 50-8.6 MG 2 tablet  2 tablet Oral BID PRN Rico Dalal MD       • bisacodyl (DULCOLAX) suppository 10 mg  10 mg Rectal Daily PRN Rico Dalal MD       • magnesium hydroxide (MILK OF MAGNESIA) 400 MG/5ML suspension 30 mL  30 mL Oral Daily PRN Rico Dalal MD       • famotidine (PEPCID) tablet 20 mg  20 mg Oral Daily Rico Dalal MD   20 mg at 10/17/23 0753   • chlorhexidine gluconate (PERIDEX) 0.12 % solution 15 mL  15 mL Swish &  Creatinine, Fluid 1.75 (H) 0.50 - 1.20 mg/dL            ASSESSMENT/PLAN:     Post op day one left robotic partial nephrectomy. Patient is doing well. Tolerating regular diet. Has walked in the hallway. MESERET Cr is normal. Plan to remove Lowry catheter and MESERET drain. Patient can be discharged home later today if he feels comfortable with it. Otherwise, stay one more night.   No

## 2025-03-27 NOTE — PATIENT PROFILE ADULT - FALL HARM RISK - RISK INTERVENTIONS

## 2025-03-28 PROBLEM — E78.5 HYPERLIPIDEMIA, UNSPECIFIED: Chronic | Status: ACTIVE | Noted: 2025-03-22

## 2025-03-28 LAB
ALBUMIN SERPL ELPH-MCNC: 3.9 G/DL — SIGNIFICANT CHANGE UP (ref 3.5–5.2)
ALP SERPL-CCNC: 141 U/L — HIGH (ref 30–115)
ALT FLD-CCNC: 15 U/L — SIGNIFICANT CHANGE UP (ref 0–41)
ANION GAP SERPL CALC-SCNC: 9 MMOL/L — SIGNIFICANT CHANGE UP (ref 7–14)
AST SERPL-CCNC: 13 U/L — SIGNIFICANT CHANGE UP (ref 0–41)
BASOPHILS # BLD AUTO: 0.03 K/UL — SIGNIFICANT CHANGE UP (ref 0–0.2)
BASOPHILS NFR BLD AUTO: 0.3 % — SIGNIFICANT CHANGE UP (ref 0–1)
BILIRUB SERPL-MCNC: 0.9 MG/DL — SIGNIFICANT CHANGE UP (ref 0.2–1.2)
BUN SERPL-MCNC: 21 MG/DL — HIGH (ref 10–20)
CALCIUM SERPL-MCNC: 9.2 MG/DL — SIGNIFICANT CHANGE UP (ref 8.4–10.5)
CHLORIDE SERPL-SCNC: 103 MMOL/L — SIGNIFICANT CHANGE UP (ref 98–110)
CO2 SERPL-SCNC: 27 MMOL/L — SIGNIFICANT CHANGE UP (ref 17–32)
CREAT SERPL-MCNC: 1.2 MG/DL — SIGNIFICANT CHANGE UP (ref 0.7–1.5)
EGFR: 52 ML/MIN/1.73M2 — LOW
EGFR: 52 ML/MIN/1.73M2 — LOW
EOSINOPHIL # BLD AUTO: 0.22 K/UL — SIGNIFICANT CHANGE UP (ref 0–0.7)
EOSINOPHIL NFR BLD AUTO: 2 % — SIGNIFICANT CHANGE UP (ref 0–8)
GLUCOSE BLDC GLUCOMTR-MCNC: 198 MG/DL — HIGH (ref 70–99)
GLUCOSE BLDC GLUCOMTR-MCNC: 234 MG/DL — HIGH (ref 70–99)
GLUCOSE BLDC GLUCOMTR-MCNC: 243 MG/DL — HIGH (ref 70–99)
GLUCOSE BLDC GLUCOMTR-MCNC: 95 MG/DL — SIGNIFICANT CHANGE UP (ref 70–99)
GLUCOSE SERPL-MCNC: 122 MG/DL — HIGH (ref 70–99)
HCT VFR BLD CALC: 35.2 % — LOW (ref 37–47)
HGB BLD-MCNC: 11.5 G/DL — LOW (ref 12–16)
IMM GRANULOCYTES NFR BLD AUTO: 0.3 % — SIGNIFICANT CHANGE UP (ref 0.1–0.3)
LYMPHOCYTES # BLD AUTO: 29.7 % — SIGNIFICANT CHANGE UP (ref 20.5–51.1)
LYMPHOCYTES # BLD AUTO: 3.23 K/UL — SIGNIFICANT CHANGE UP (ref 1.2–3.4)
MCHC RBC-ENTMCNC: 26.7 PG — LOW (ref 27–31)
MCHC RBC-ENTMCNC: 32.7 G/DL — SIGNIFICANT CHANGE UP (ref 32–37)
MCV RBC AUTO: 81.7 FL — SIGNIFICANT CHANGE UP (ref 81–99)
MONOCYTES # BLD AUTO: 0.89 K/UL — HIGH (ref 0.1–0.6)
MONOCYTES NFR BLD AUTO: 8.2 % — SIGNIFICANT CHANGE UP (ref 1.7–9.3)
NEUTROPHILS # BLD AUTO: 6.49 K/UL — SIGNIFICANT CHANGE UP (ref 1.4–6.5)
NEUTROPHILS NFR BLD AUTO: 59.5 % — SIGNIFICANT CHANGE UP (ref 42.2–75.2)
NRBC BLD AUTO-RTO: 0 /100 WBCS — SIGNIFICANT CHANGE UP (ref 0–0)
PLATELET # BLD AUTO: 346 K/UL — SIGNIFICANT CHANGE UP (ref 130–400)
PMV BLD: 10.9 FL — HIGH (ref 7.4–10.4)
POTASSIUM SERPL-MCNC: 4.7 MMOL/L — SIGNIFICANT CHANGE UP (ref 3.5–5)
POTASSIUM SERPL-SCNC: 4.7 MMOL/L — SIGNIFICANT CHANGE UP (ref 3.5–5)
PROT SERPL-MCNC: 7.5 G/DL — SIGNIFICANT CHANGE UP (ref 6–8)
RBC # BLD: 4.31 M/UL — SIGNIFICANT CHANGE UP (ref 4.2–5.4)
RBC # FLD: 14 % — SIGNIFICANT CHANGE UP (ref 11.5–14.5)
SODIUM SERPL-SCNC: 139 MMOL/L — SIGNIFICANT CHANGE UP (ref 135–146)
WBC # BLD: 10.89 K/UL — HIGH (ref 4.8–10.8)
WBC # FLD AUTO: 10.89 K/UL — HIGH (ref 4.8–10.8)

## 2025-03-28 PROCEDURE — 99233 SBSQ HOSP IP/OBS HIGH 50: CPT

## 2025-03-28 RX ORDER — LIDOCAINE HCL/PF 10 MG/ML
15 VIAL (ML) INJECTION
Refills: 0 | Status: DISCONTINUED | OUTPATIENT
Start: 2025-03-28 | End: 2025-03-28

## 2025-03-28 RX ORDER — BENZOCAINE 220 MG/G
1 SPRAY, METERED PERIODONTAL EVERY 4 HOURS
Refills: 0 | Status: DISCONTINUED | OUTPATIENT
Start: 2025-03-28 | End: 2025-03-31

## 2025-03-28 RX ORDER — LIDOCAINE HCL/PF 10 MG/ML
15 VIAL (ML) INJECTION EVERY 4 HOURS
Refills: 0 | Status: DISCONTINUED | OUTPATIENT
Start: 2025-03-28 | End: 2025-03-31

## 2025-03-28 RX ADMIN — HEPARIN SODIUM 5000 UNIT(S): 1000 INJECTION INTRAVENOUS; SUBCUTANEOUS at 13:32

## 2025-03-28 RX ADMIN — HEPARIN SODIUM 5000 UNIT(S): 1000 INJECTION INTRAVENOUS; SUBCUTANEOUS at 22:04

## 2025-03-28 RX ADMIN — INSULIN LISPRO 1: 100 INJECTION, SOLUTION INTRAVENOUS; SUBCUTANEOUS at 08:11

## 2025-03-28 RX ADMIN — INSULIN GLARGINE-YFGN 15 UNIT(S): 100 INJECTION, SOLUTION SUBCUTANEOUS at 22:02

## 2025-03-28 RX ADMIN — INSULIN LISPRO 8 UNIT(S): 100 INJECTION, SOLUTION INTRAVENOUS; SUBCUTANEOUS at 08:10

## 2025-03-28 RX ADMIN — BENZOCAINE 1 SPRAY(S): 220 SPRAY, METERED PERIODONTAL at 22:03

## 2025-03-28 RX ADMIN — INSULIN LISPRO 2: 100 INJECTION, SOLUTION INTRAVENOUS; SUBCUTANEOUS at 16:54

## 2025-03-28 RX ADMIN — Medication 1 APPLICATION(S): at 05:55

## 2025-03-28 RX ADMIN — Medication 650 MILLIGRAM(S): at 13:00

## 2025-03-28 RX ADMIN — Medication 650 MILLIGRAM(S): at 06:39

## 2025-03-28 RX ADMIN — HEPARIN SODIUM 5000 UNIT(S): 1000 INJECTION INTRAVENOUS; SUBCUTANEOUS at 05:48

## 2025-03-28 RX ADMIN — ROSUVASTATIN CALCIUM 10 MILLIGRAM(S): 5 TABLET, FILM COATED ORAL at 22:04

## 2025-03-28 RX ADMIN — INSULIN LISPRO 8 UNIT(S): 100 INJECTION, SOLUTION INTRAVENOUS; SUBCUTANEOUS at 16:53

## 2025-03-28 RX ADMIN — AMLODIPINE BESYLATE 10 MILLIGRAM(S): 10 TABLET ORAL at 05:48

## 2025-03-28 RX ADMIN — Medication 650 MILLIGRAM(S): at 14:04

## 2025-03-28 RX ADMIN — BENZOCAINE 1 SPRAY(S): 220 SPRAY, METERED PERIODONTAL at 15:43

## 2025-03-28 RX ADMIN — Medication 650 MILLIGRAM(S): at 06:18

## 2025-03-28 NOTE — PROGRESS NOTE ADULT - SUBJECTIVE AND OBJECTIVE BOX
LENGTH OF HOSPITAL STAY: 1d    CHIEF COMPLAINT:    Patient is a 60y old  Female who presents with a chief complaint of     OVERNIGHT EVENTS:    - tele reviewed by me NSR 63 no events  - no overnight events  - pt examined at bedside, discussed plan and answered questions  - tolerating PO, having BMs, making urine without urinary sxs    FOLLOW UP:    - improved BP, head/scalp pain resolution    HPI:    60yoF PMHx obesity, DM, HTN, HLD, CKD 3A, recent admission for hypertensive emergency BP on arrival 170s/70s, returning to the ED today for sudden onset severe headache, dizziness, nausea, vomiting at 3pm when she woke up. She reports she went to sleep earlier today, but does not know what time. ED spoke with patient's , who reports she was feeling headache and cold last night, This morning he saw her at about 10am before he left and she was acting fine. Then she called him at 3pm complaining of a headache again and he told her to go to the hospital. patient had similar symptoms 72 hours prior to arrival [facility]. She reports taking her hypertensive medications this morning. Currently, pt states HA and dizziness symptoms are improved in the ED currently laying in bed comfortably and relaxed.  denies any fevers chills diaphoresis denies difficulty walking, CP, SOB  Pt is AA+O x3      ALLERGIES:    chloroquine (Pruritus)      PMHx:    Hypertension  HTN  Diabetes mellitus  HLD (hyperlipidemia)  H/O: obesity  Stage 3 chronic kidney disease      MEDICATIONS:  STANDING MEDICATIONS  amLODIPine   Tablet 10 milliGRAM(s) Oral daily  chlorhexidine 2% Cloths 1 Application(s) Topical <User Schedule>  heparin   Injectable 5000 Unit(s) SubCutaneous every 8 hours  hydrochlorothiazide 12.5 milliGRAM(s) Oral daily  influenza   Vaccine 0.5 milliLiter(s) IntraMuscular once  insulin glargine Injectable (LANTUS) 15 Unit(s) SubCutaneous at bedtime  insulin lispro (ADMELOG) corrective regimen sliding scale   SubCutaneous three times a day before meals  insulin lispro Injectable (ADMELOG) 8 Unit(s) SubCutaneous before breakfast  insulin lispro Injectable (ADMELOG) 8 Unit(s) SubCutaneous before lunch  insulin lispro Injectable (ADMELOG) 8 Unit(s) SubCutaneous before dinner  rosuvastatin 10 milliGRAM(s) Oral at bedtime    PRN MEDICATIONS  acetaminophen     Tablet .. 650 milliGRAM(s) Oral every 6 hours PRN  aluminum hydroxide/magnesium hydroxide/simethicone Suspension 30 milliLiter(s) Oral every 4 hours PRN  benzocaine 20%/menthol 0.5% Spray 1 Spray(s) Topical every 4 hours PRN  lidocaine 2% Jelly 15 milliLiter(s) Topical every 4 hours PRN  ondansetron Injectable 4 milliGRAM(s) IV Push every 8 hours PRN      LABS:                        11.5   10.89 )-----------( 346      ( 28 Mar 2025 06:25 )             35.2     03-28    139  |  103  |  21[H]  ----------------------------<  122[H]  4.7   |  27  |  1.2    Ca    9.2      28 Mar 2025 06:25    TPro  7.5  /  Alb  3.9  /  TBili  0.9  /  DBili  x   /  AST  13  /  ALT  15  /  AlkPhos  141[H]  03-28    PT/INR - ( 27 Mar 2025 17:25 )   PT: 10.40 sec;   INR: 0.88 ratio         PTT - ( 27 Mar 2025 17:25 )  PTT:28.4 sec  Urinalysis Basic - ( 28 Mar 2025 06:25 )    Color: x / Appearance: x / SG: x / pH: x  Gluc: 122 mg/dL / Ketone: x  / Bili: x / Urobili: x   Blood: x / Protein: x / Nitrite: x   Leuk Esterase: x / RBC: x / WBC x   Sq Epi: x / Non Sq Epi: x / Bacteria: x        Lactate, Blood: 1.6 mmol/L (03-27-25 @ 21:20)      Urinalysis with Rflx Culture (collected 27 Mar 2025 17:21)      VITALS:   T(F): 98.2  HR: 72  BP: 151/81  RR: 18  SpO2: 100%        PHYSICAL EXAM:    Gen: NAD, resting in bed  HEENT: Normocephalic, atraumatic patchy bald spot on head, mylar rash  Neck: supple, no lymphadenopathy  CV: Regular rate & regular rhythm  Lungs: decreased BS at bases, no fremitus  Abdomen: Soft, BS present  Ext: Warm, well perfused  Neuro: moves all extremities against resistance, no droop, awake  Skin: no rash, no erythema

## 2025-03-29 LAB
ANION GAP SERPL CALC-SCNC: 10 MMOL/L — SIGNIFICANT CHANGE UP (ref 7–14)
BASOPHILS # BLD AUTO: 0.03 K/UL — SIGNIFICANT CHANGE UP (ref 0–0.2)
BASOPHILS NFR BLD AUTO: 0.4 % — SIGNIFICANT CHANGE UP (ref 0–1)
BUN SERPL-MCNC: 24 MG/DL — HIGH (ref 10–20)
CALCIUM SERPL-MCNC: 8.4 MG/DL — SIGNIFICANT CHANGE UP (ref 8.4–10.5)
CHLORIDE SERPL-SCNC: 102 MMOL/L — SIGNIFICANT CHANGE UP (ref 98–110)
CO2 SERPL-SCNC: 26 MMOL/L — SIGNIFICANT CHANGE UP (ref 17–32)
CREAT SERPL-MCNC: 1.4 MG/DL — SIGNIFICANT CHANGE UP (ref 0.7–1.5)
EGFR: 44 ML/MIN/1.73M2 — LOW
EGFR: 44 ML/MIN/1.73M2 — LOW
EOSINOPHIL # BLD AUTO: 0.34 K/UL — SIGNIFICANT CHANGE UP (ref 0–0.7)
EOSINOPHIL NFR BLD AUTO: 4.2 % — SIGNIFICANT CHANGE UP (ref 0–8)
GLUCOSE BLDC GLUCOMTR-MCNC: 137 MG/DL — HIGH (ref 70–99)
GLUCOSE BLDC GLUCOMTR-MCNC: 157 MG/DL — HIGH (ref 70–99)
GLUCOSE BLDC GLUCOMTR-MCNC: 221 MG/DL — HIGH (ref 70–99)
GLUCOSE BLDC GLUCOMTR-MCNC: 286 MG/DL — HIGH (ref 70–99)
GLUCOSE SERPL-MCNC: 258 MG/DL — HIGH (ref 70–99)
HCT VFR BLD CALC: 32.9 % — LOW (ref 37–47)
HGB BLD-MCNC: 10.8 G/DL — LOW (ref 12–16)
IMM GRANULOCYTES NFR BLD AUTO: 0.4 % — HIGH (ref 0.1–0.3)
IRON SATN MFR SERPL: 14 % — LOW (ref 15–50)
IRON SATN MFR SERPL: 39 UG/DL — SIGNIFICANT CHANGE UP (ref 35–150)
LYMPHOCYTES # BLD AUTO: 3.5 K/UL — HIGH (ref 1.2–3.4)
LYMPHOCYTES # BLD AUTO: 43.6 % — SIGNIFICANT CHANGE UP (ref 20.5–51.1)
MAGNESIUM SERPL-MCNC: 2.2 MG/DL — SIGNIFICANT CHANGE UP (ref 1.8–2.4)
MCHC RBC-ENTMCNC: 27.2 PG — SIGNIFICANT CHANGE UP (ref 27–31)
MCHC RBC-ENTMCNC: 32.8 G/DL — SIGNIFICANT CHANGE UP (ref 32–37)
MCV RBC AUTO: 82.9 FL — SIGNIFICANT CHANGE UP (ref 81–99)
MONOCYTES # BLD AUTO: 0.68 K/UL — HIGH (ref 0.1–0.6)
MONOCYTES NFR BLD AUTO: 8.5 % — SIGNIFICANT CHANGE UP (ref 1.7–9.3)
NEUTROPHILS # BLD AUTO: 3.44 K/UL — SIGNIFICANT CHANGE UP (ref 1.4–6.5)
NEUTROPHILS NFR BLD AUTO: 42.9 % — SIGNIFICANT CHANGE UP (ref 42.2–75.2)
NRBC BLD AUTO-RTO: 0 /100 WBCS — SIGNIFICANT CHANGE UP (ref 0–0)
PHOSPHATE SERPL-MCNC: 3.7 MG/DL — SIGNIFICANT CHANGE UP (ref 2.1–4.9)
PLATELET # BLD AUTO: 334 K/UL — SIGNIFICANT CHANGE UP (ref 130–400)
PMV BLD: 11.6 FL — HIGH (ref 7.4–10.4)
POTASSIUM SERPL-MCNC: 4.8 MMOL/L — SIGNIFICANT CHANGE UP (ref 3.5–5)
POTASSIUM SERPL-SCNC: 4.8 MMOL/L — SIGNIFICANT CHANGE UP (ref 3.5–5)
RBC # BLD: 3.97 M/UL — LOW (ref 4.2–5.4)
RBC # FLD: 14.2 % — SIGNIFICANT CHANGE UP (ref 11.5–14.5)
SICKLE CELL DISEASE SCREENING TEST: POSITIVE
SODIUM SERPL-SCNC: 138 MMOL/L — SIGNIFICANT CHANGE UP (ref 135–146)
TIBC SERPL-MCNC: 271 UG/DL — SIGNIFICANT CHANGE UP (ref 220–430)
UIBC SERPL-MCNC: 232 UG/DL — SIGNIFICANT CHANGE UP (ref 110–370)
WBC # BLD: 8.02 K/UL — SIGNIFICANT CHANGE UP (ref 4.8–10.8)
WBC # FLD AUTO: 8.02 K/UL — SIGNIFICANT CHANGE UP (ref 4.8–10.8)

## 2025-03-29 PROCEDURE — 76775 US EXAM ABDO BACK WALL LIM: CPT | Mod: 26

## 2025-03-29 PROCEDURE — 99232 SBSQ HOSP IP/OBS MODERATE 35: CPT

## 2025-03-29 RX ADMIN — INSULIN GLARGINE-YFGN 15 UNIT(S): 100 INJECTION, SOLUTION SUBCUTANEOUS at 21:19

## 2025-03-29 RX ADMIN — AMLODIPINE BESYLATE 10 MILLIGRAM(S): 10 TABLET ORAL at 06:16

## 2025-03-29 RX ADMIN — INSULIN LISPRO 8 UNIT(S): 100 INJECTION, SOLUTION INTRAVENOUS; SUBCUTANEOUS at 07:43

## 2025-03-29 RX ADMIN — Medication 650 MILLIGRAM(S): at 14:14

## 2025-03-29 RX ADMIN — HEPARIN SODIUM 5000 UNIT(S): 1000 INJECTION INTRAVENOUS; SUBCUTANEOUS at 21:19

## 2025-03-29 RX ADMIN — INSULIN LISPRO 8 UNIT(S): 100 INJECTION, SOLUTION INTRAVENOUS; SUBCUTANEOUS at 11:41

## 2025-03-29 RX ADMIN — INSULIN LISPRO 1: 100 INJECTION, SOLUTION INTRAVENOUS; SUBCUTANEOUS at 11:40

## 2025-03-29 RX ADMIN — BENZOCAINE 1 SPRAY(S): 220 SPRAY, METERED PERIODONTAL at 03:43

## 2025-03-29 RX ADMIN — INSULIN LISPRO 3: 100 INJECTION, SOLUTION INTRAVENOUS; SUBCUTANEOUS at 07:43

## 2025-03-29 RX ADMIN — Medication 1 APPLICATION(S): at 06:17

## 2025-03-29 RX ADMIN — HEPARIN SODIUM 5000 UNIT(S): 1000 INJECTION INTRAVENOUS; SUBCUTANEOUS at 06:17

## 2025-03-29 RX ADMIN — ROSUVASTATIN CALCIUM 10 MILLIGRAM(S): 5 TABLET, FILM COATED ORAL at 21:19

## 2025-03-29 NOTE — PROGRESS NOTE ADULT - SUBJECTIVE AND OBJECTIVE BOX
LENGTH OF HOSPITAL STAY: 2d    CHIEF COMPLAINT:    Patient is a 60y old  Female who presents with a chief complaint of     OVERNIGHT EVENTS:    - no overnight events  - pt examined at bedside,  - tolerating PO, having BMs, making urine without urinary sxs    FOLLOW UP:    - labs, clinical improvement    HPI:    60yoF PMHx obesity, DM, HTN, HLD, CKD 3A, recent admission for hypertensive emergency BP on arrival 170s/70s, returning to the ED today for sudden onset severe headache, dizziness, nausea, vomiting at 3pm when she woke up. She reports she went to sleep earlier today, but does not know what time. ED spoke with patient's , who reports she was feeling headache and cold last night, This morning he saw her at about 10am before he left and she was acting fine. Then she called him at 3pm complaining of a headache again and he told her to go to the hospital. patient had similar symptoms 72 hours prior to arrival [facility]. She reports taking her hypertensive medications this morning. Currently, pt states HA and dizziness symptoms are improved in the ED currently laying in bed comfortably and relaxed.  denies any fevers chills diaphoresis denies difficulty walking, CP, SOB  Pt is AA+O x3     (27 Mar 2025 19:30)    ALLERGIES:    chloroquine (Pruritus)      PMHx:    Hypertension  HTN  Diabetes mellitus  HLD (hyperlipidemia)  H/O: obesity  Stage 3 chronic kidney disease    MEDICATIONS:  STANDING MEDICATIONS  amLODIPine   Tablet 10 milliGRAM(s) Oral daily  chlorhexidine 2% Cloths 1 Application(s) Topical <User Schedule>  heparin   Injectable 5000 Unit(s) SubCutaneous every 8 hours  hydrochlorothiazide 12.5 milliGRAM(s) Oral daily  influenza   Vaccine 0.5 milliLiter(s) IntraMuscular once  insulin glargine Injectable (LANTUS) 15 Unit(s) SubCutaneous at bedtime  insulin lispro (ADMELOG) corrective regimen sliding scale   SubCutaneous three times a day before meals  insulin lispro Injectable (ADMELOG) 8 Unit(s) SubCutaneous before breakfast  insulin lispro Injectable (ADMELOG) 8 Unit(s) SubCutaneous before lunch  insulin lispro Injectable (ADMELOG) 8 Unit(s) SubCutaneous before dinner  rosuvastatin 10 milliGRAM(s) Oral at bedtime    PRN MEDICATIONS  acetaminophen     Tablet .. 650 milliGRAM(s) Oral every 6 hours PRN  aluminum hydroxide/magnesium hydroxide/simethicone Suspension 30 milliLiter(s) Oral every 4 hours PRN  benzocaine 20%/menthol 0.5% Spray 1 Spray(s) Topical every 4 hours PRN  lidocaine 2% Jelly 15 milliLiter(s) Topical every 4 hours PRN  ondansetron Injectable 4 milliGRAM(s) IV Push every 8 hours PRN      LABS:                        10.8   8.02  )-----------( 334      ( 29 Mar 2025 07:59 )             32.9     03-29    138  |  102  |  24[H]  ----------------------------<  258[H]  4.8   |  26  |  1.4    Ca    8.4      29 Mar 2025 07:59  Phos  3.7     03-29  Mg     2.2     03-29    TPro  7.5  /  Alb  3.9  /  TBili  0.9  /  DBili  x   /  AST  13  /  ALT  15  /  AlkPhos  141[H]  03-28    PT/INR - ( 27 Mar 2025 17:25 )   PT: 10.40 sec;   INR: 0.88 ratio         PTT - ( 27 Mar 2025 17:25 )  PTT:28.4 sec  Urinalysis Basic - ( 29 Mar 2025 07:59 )    Color: x / Appearance: x / SG: x / pH: x  Gluc: 258 mg/dL / Ketone: x  / Bili: x / Urobili: x   Blood: x / Protein: x / Nitrite: x   Leuk Esterase: x / RBC: x / WBC x   Sq Epi: x / Non Sq Epi: x / Bacteria: x    Urinalysis with Rflx Culture (collected 27 Mar 2025 17:21)      VITALS:   T(F): 98.2  HR: 67  BP: 129/76  RR: 16  SpO2: 100%        PHYSICAL EXAM:    Gen: NAD, resting in bed  HEENT: Normocephalic, atraumatic patchy bald spot on head, mylar rash  Neck: supple, no lymphadenopathy  CV: Regular rate & regular rhythm  Lungs: decreased BS at bases, no fremitus  Abdomen: Soft, BS present  Ext: Warm, well perfused  Neuro: moves all extremities against resistance, no droop, awake  Skin: no rash, no erythema

## 2025-03-30 LAB
FERRITIN SERPL-MCNC: 289 NG/ML — SIGNIFICANT CHANGE UP (ref 13–330)
FOLATE SERPL-MCNC: 16.3 NG/ML — SIGNIFICANT CHANGE UP
GLUCOSE BLDC GLUCOMTR-MCNC: 142 MG/DL — HIGH (ref 70–99)
GLUCOSE BLDC GLUCOMTR-MCNC: 148 MG/DL — HIGH (ref 70–99)
GLUCOSE BLDC GLUCOMTR-MCNC: 190 MG/DL — HIGH (ref 70–99)
GLUCOSE BLDC GLUCOMTR-MCNC: 225 MG/DL — HIGH (ref 70–99)
HGB A MFR BLD: 58.2 % — LOW (ref 95.8–98)
HGB A2 MFR BLD: 3 % — SIGNIFICANT CHANGE UP (ref 2–3.2)
HGB C MFR BLD: 0 % — SIGNIFICANT CHANGE UP
HGB F MFR BLD: 0 % — SIGNIFICANT CHANGE UP (ref 0–1)
HGB OTHER MFR BLD ELPH: 0 % — SIGNIFICANT CHANGE UP
HGB S MFR BLD: 38.8 % — HIGH
VIT B12 SERPL-MCNC: 744 PG/ML — SIGNIFICANT CHANGE UP (ref 232–1245)

## 2025-03-30 PROCEDURE — 99232 SBSQ HOSP IP/OBS MODERATE 35: CPT

## 2025-03-30 RX ADMIN — INSULIN LISPRO 8 UNIT(S): 100 INJECTION, SOLUTION INTRAVENOUS; SUBCUTANEOUS at 12:03

## 2025-03-30 RX ADMIN — HEPARIN SODIUM 5000 UNIT(S): 1000 INJECTION INTRAVENOUS; SUBCUTANEOUS at 06:38

## 2025-03-30 RX ADMIN — HEPARIN SODIUM 5000 UNIT(S): 1000 INJECTION INTRAVENOUS; SUBCUTANEOUS at 13:08

## 2025-03-30 RX ADMIN — INSULIN LISPRO 1: 100 INJECTION, SOLUTION INTRAVENOUS; SUBCUTANEOUS at 12:03

## 2025-03-30 RX ADMIN — AMLODIPINE BESYLATE 10 MILLIGRAM(S): 10 TABLET ORAL at 06:38

## 2025-03-30 RX ADMIN — Medication 1 APPLICATION(S): at 06:39

## 2025-03-30 RX ADMIN — INSULIN GLARGINE-YFGN 15 UNIT(S): 100 INJECTION, SOLUTION SUBCUTANEOUS at 22:11

## 2025-03-30 NOTE — PROGRESS NOTE ADULT - SUBJECTIVE AND OBJECTIVE BOX
LENGTH OF HOSPITAL STAY: 3d    CHIEF COMPLAINT:    Patient is a 60y old  Female who presents with a chief complaint of     OVERNIGHT EVENTS:    - no overnight events  - pt examined at bedside, significantly improved from prior, discussed plan and answered questions  - tolerating PO, having BMs, making urine without urinary sxs    FOLLOW UP:    - heme cs    HPI:    60yoF PMHx obesity, DM, HTN, HLD, CKD 3A, recent admission for hypertensive emergency BP on arrival 170s/70s, returning to the ED today for sudden onset severe headache, dizziness, nausea, vomiting at 3pm when she woke up. She reports she went to sleep earlier today, but does not know what time. ED spoke with patient's , who reports she was feeling headache and cold last night, This morning he saw her at about 10am before he left and she was acting fine. Then she called him at 3pm complaining of a headache again and he told her to go to the hospital. patient had similar symptoms 72 hours prior to arrival [facility] and evaluated [facility]. She reports taking her hypertensive medications this morning. Currently, pt states HA and dizziness symptoms are improved in the ED currently laying in bed comfortably and relaxed.  denies any fevers chills diaphoresis denies difficulty walking, CP, SOB  Pt is AA+O x3     (27 Mar 2025 19:30)    ALLERGIES:    chloroquine (Pruritus)      PMHx:    Hypertension  HTN  Diabetes mellitus  HLD (hyperlipidemia)  H/O: obesity  Stage 3 chronic kidney disease    MEDICATIONS:  STANDING MEDICATIONS  amLODIPine   Tablet 10 milliGRAM(s) Oral daily  chlorhexidine 2% Cloths 1 Application(s) Topical <User Schedule>  heparin   Injectable 5000 Unit(s) SubCutaneous every 8 hours  hydrochlorothiazide 12.5 milliGRAM(s) Oral daily  influenza   Vaccine 0.5 milliLiter(s) IntraMuscular once  insulin glargine Injectable (LANTUS) 15 Unit(s) SubCutaneous at bedtime  insulin lispro (ADMELOG) corrective regimen sliding scale   SubCutaneous three times a day before meals  insulin lispro Injectable (ADMELOG) 8 Unit(s) SubCutaneous before breakfast  insulin lispro Injectable (ADMELOG) 8 Unit(s) SubCutaneous before lunch  insulin lispro Injectable (ADMELOG) 8 Unit(s) SubCutaneous before dinner  rosuvastatin 10 milliGRAM(s) Oral at bedtime  sodium chloride 0.9%. 1000 milliLiter(s) IV Continuous <Continuous>    PRN MEDICATIONS  acetaminophen     Tablet .. 650 milliGRAM(s) Oral every 6 hours PRN  aluminum hydroxide/magnesium hydroxide/simethicone Suspension 30 milliLiter(s) Oral every 4 hours PRN  benzocaine 20%/menthol 0.5% Spray 1 Spray(s) Topical every 4 hours PRN  lidocaine 2% Jelly 15 milliLiter(s) Topical every 4 hours PRN  ondansetron Injectable 4 milliGRAM(s) IV Push every 8 hours PRN      LABS:                        10.8   8.02  )-----------( 334      ( 29 Mar 2025 07:59 )             32.9     03-29    138  |  102  |  24[H]  ----------------------------<  258[H]  4.8   |  26  |  1.4    Ca    8.4      29 Mar 2025 07:59  Phos  3.7     03-29  Mg     2.2     03-29        Urinalysis Basic - ( 29 Mar 2025 07:59 )    Color: x / Appearance: x / SG: x / pH: x  Gluc: 258 mg/dL / Ketone: x  / Bili: x / Urobili: x   Blood: x / Protein: x / Nitrite: x   Leuk Esterase: x / RBC: x / WBC x   Sq Epi: x / Non Sq Epi: x / Bacteria: x            Culture - Blood (collected 28 Mar 2025 00:36)  Source: Blood Blood-Peripheral  Preliminary Report (29 Mar 2025 17:00):    No growth at 24 hours    Urinalysis with Rflx Culture (collected 27 Mar 2025 17:21)      VITALS:   T(F): 98.2  HR: 60  BP: 154/88  RR: 16  SpO2: 99%          PHYSICAL EXAM:    Gen: NAD, resting in bed  HEENT: Normocephalic, atraumatic patchy bald spot on head, mylar rash  Neck: supple, no lymphadenopathy  CV: Regular rate & regular rhythm  Lungs: decreased BS at bases, no fremitus  Abdomen: Soft, BS present  Ext: Warm, well perfused  Neuro: moves all extremities against resistance, no droop, awake  Skin: no rash, no erythema

## 2025-03-30 NOTE — PROGRESS NOTE ADULT - ASSESSMENT
# Dizziness  # HA - further clarified as scalp tenderness, intermittent   - OT vestibular rehab  - meclizine  - topical pain relief    # HTN urgency  - Code stroke called  - pt states symptoms are improved in the ED  - All radiographic reports show no acute findings  - Last admission on 3/22, pt was Seen by Neurology, CT, CTA head/neck, EEG, MRI, MRV all unremarkable  - troponin negative delta  - increased amlodipine to 10 added hctz to 12.5    # DM  - hold home meds  - FS monitoring  - ISS    # HLD  - c/w statin      
# Dizziness  # HA - further clarified as scalp tenderness, intermittent   - OT vestibular rehab  - meclizine  - topical pain relief --> helped, add dermaplast on d/c    # HTN urgency  - Code stroke called  - pt states symptoms are improved in the ED  - All radiographic reports show no acute findings  - Last admission on 3/22, pt was Seen by Neurology, CT, CTA head/neck, EEG, MRI, MRV all unremarkable  - troponin negative delta  - increased amlodipine to 10 added hctz to 12.5    # sickle cell trait  # anemia  - sickle screen positive - could explain some sxs, f/u electrophoresis  - Hb A 58.2 HB S 38.8  - f/u heme recs    # DM  - hold home meds  - FS monitoring  - ISS    # HLD  - c/w statin    # mylar rash  - f/u mona
# Dizziness  # HA - further clarified as scalp tenderness, intermittent   - OT vestibular rehab  - meclizine  - topical pain relief    # HTN urgency  - Code stroke called  - pt states symptoms are improved in the ED  - All radiographic reports show no acute findings  - Last admission on 3/22, pt was Seen by Neurology, CT, CTA head/neck, EEG, MRI, MRV all unremarkable  - troponin negative delta  - increased amlodipine to 10 added hctz to 12.5    # DM  - hold home meds  - FS monitoring  - ISS    # HLD  - c/w statin    # anemia  - f/u labs  - sickle screen positive - could explain some sxs, f/u electrophoresis    # mylar rash  - f/u mona

## 2025-03-31 ENCOUNTER — TRANSCRIPTION ENCOUNTER (OUTPATIENT)
Age: 60
End: 2025-03-31

## 2025-03-31 VITALS
RESPIRATION RATE: 18 BRPM | OXYGEN SATURATION: 98 % | HEART RATE: 63 BPM | TEMPERATURE: 98 F | DIASTOLIC BLOOD PRESSURE: 76 MMHG | SYSTOLIC BLOOD PRESSURE: 135 MMHG

## 2025-03-31 LAB
ANION GAP SERPL CALC-SCNC: 9 MMOL/L — SIGNIFICANT CHANGE UP (ref 7–14)
BUN SERPL-MCNC: 29 MG/DL — HIGH (ref 10–20)
CALCIUM SERPL-MCNC: 8.8 MG/DL — SIGNIFICANT CHANGE UP (ref 8.4–10.5)
CHLORIDE SERPL-SCNC: 103 MMOL/L — SIGNIFICANT CHANGE UP (ref 98–110)
CO2 SERPL-SCNC: 27 MMOL/L — SIGNIFICANT CHANGE UP (ref 17–32)
CREAT SERPL-MCNC: 1.5 MG/DL — SIGNIFICANT CHANGE UP (ref 0.7–1.5)
EGFR: 40 ML/MIN/1.73M2 — LOW
EGFR: 40 ML/MIN/1.73M2 — LOW
GLUCOSE BLDC GLUCOMTR-MCNC: 135 MG/DL — HIGH (ref 70–99)
GLUCOSE BLDC GLUCOMTR-MCNC: 172 MG/DL — HIGH (ref 70–99)
GLUCOSE BLDC GLUCOMTR-MCNC: 195 MG/DL — HIGH (ref 70–99)
GLUCOSE SERPL-MCNC: 184 MG/DL — HIGH (ref 70–99)
HCT VFR BLD CALC: 33.3 % — LOW (ref 37–47)
HEMOGLOBIN INTERPRETATION: SIGNIFICANT CHANGE UP
HGB A MFR BLD: 57.4 % — LOW (ref 95.8–98)
HGB A2 MFR BLD: 3 % — SIGNIFICANT CHANGE UP (ref 2–3.2)
HGB BLD-MCNC: 11 G/DL — LOW (ref 12–16)
HGB S MFR BLD: 39.6 % — HIGH
LDH SERPL L TO P-CCNC: 179 — SIGNIFICANT CHANGE UP (ref 50–242)
MAGNESIUM SERPL-MCNC: 2.1 MG/DL — SIGNIFICANT CHANGE UP (ref 1.8–2.4)
MCHC RBC-ENTMCNC: 27.2 PG — SIGNIFICANT CHANGE UP (ref 27–31)
MCHC RBC-ENTMCNC: 33 G/DL — SIGNIFICANT CHANGE UP (ref 32–37)
MCV RBC AUTO: 82.4 FL — SIGNIFICANT CHANGE UP (ref 81–99)
NRBC BLD AUTO-RTO: 0 /100 WBCS — SIGNIFICANT CHANGE UP (ref 0–0)
PHOSPHATE SERPL-MCNC: 4.6 MG/DL — SIGNIFICANT CHANGE UP (ref 2.1–4.9)
PLATELET # BLD AUTO: 328 K/UL — SIGNIFICANT CHANGE UP (ref 130–400)
PMV BLD: 11.7 FL — HIGH (ref 7.4–10.4)
POTASSIUM SERPL-MCNC: 4.5 MMOL/L — SIGNIFICANT CHANGE UP (ref 3.5–5)
POTASSIUM SERPL-SCNC: 4.5 MMOL/L — SIGNIFICANT CHANGE UP (ref 3.5–5)
RBC # BLD: 4.04 M/UL — LOW (ref 4.2–5.4)
RBC # BLD: 4.04 M/UL — LOW (ref 4.2–5.4)
RBC # FLD: 14 % — SIGNIFICANT CHANGE UP (ref 11.5–14.5)
RETICS #: 103.4 K/UL — SIGNIFICANT CHANGE UP (ref 25–125)
RETICS/RBC NFR: 2.6 % — HIGH (ref 0.5–1.5)
SODIUM SERPL-SCNC: 139 MMOL/L — SIGNIFICANT CHANGE UP (ref 135–146)
WBC # BLD: 7.54 K/UL — SIGNIFICANT CHANGE UP (ref 4.8–10.8)
WBC # FLD AUTO: 7.54 K/UL — SIGNIFICANT CHANGE UP (ref 4.8–10.8)

## 2025-03-31 PROCEDURE — 99239 HOSP IP/OBS DSCHRG MGMT >30: CPT

## 2025-03-31 RX ORDER — HYDROCHLOROTHIAZIDE 50 MG/1
1 TABLET ORAL
Qty: 30 | Refills: 0
Start: 2025-03-31 | End: 2025-04-29

## 2025-03-31 RX ORDER — BENZETHONIUM CHLORIDE AND BENZOCAINE 2; 200 MG/G; MG/G
1 SPRAY TOPICAL
Qty: 1 | Refills: 0
Start: 2025-03-31 | End: 2025-04-29

## 2025-03-31 RX ADMIN — INSULIN LISPRO 1: 100 INJECTION, SOLUTION INTRAVENOUS; SUBCUTANEOUS at 08:09

## 2025-03-31 RX ADMIN — INSULIN LISPRO 1: 100 INJECTION, SOLUTION INTRAVENOUS; SUBCUTANEOUS at 11:55

## 2025-03-31 RX ADMIN — INSULIN LISPRO 8 UNIT(S): 100 INJECTION, SOLUTION INTRAVENOUS; SUBCUTANEOUS at 08:09

## 2025-03-31 RX ADMIN — Medication 1 APPLICATION(S): at 05:41

## 2025-03-31 RX ADMIN — AMLODIPINE BESYLATE 10 MILLIGRAM(S): 10 TABLET ORAL at 05:39

## 2025-03-31 RX ADMIN — INSULIN LISPRO 8 UNIT(S): 100 INJECTION, SOLUTION INTRAVENOUS; SUBCUTANEOUS at 11:56

## 2025-03-31 NOTE — DISCHARGE NOTE PROVIDER - NSDCFUSCHEDAPPT_GEN_ALL_CORE_FT
Jorgito Cole  [facility]  Scheduled Appointment: 04/25/2025    Lydia Tuttle  [facility]  GASTRO  [address]  Scheduled Appointment: 04/25/2025    Willy Araujo  [facility]  Scheduled Appointment: 06/16/2025    Willy Araujo  [facility]  INTMED  [address]  Scheduled Appointment: 06/16/2025

## 2025-03-31 NOTE — DISCHARGE NOTE PROVIDER - ATTENDING DISCHARGE PHYSICAL EXAMINATION:
Gen: NAD, resting in bed  HEENT: Normocephalic, atraumatic patchy bald spot on head, mylar rash  Neck: supple, no lymphadenopathy  CV: Regular rate & regular rhythm  Lungs: decreased BS at bases, no fremitus  Abdomen: Soft, BS present  Ext: Warm, well perfused  Neuro: moves all extremities against resistance, no droop, awake  Skin: no rash, no erythema

## 2025-03-31 NOTE — OCCUPATIONAL THERAPY INITIAL EVALUATION ADULT - ADDITIONAL COMMENTS
PLOF obtained from pt. As per pt, pt does not ambulate with any AD at baseline and does not own any AD/DME.  (+) driving occasionally PLOF obtained from pt. As per pt, does not ambulate with AD at baseline and does not own AD/DME.  (+) driving occasionally

## 2025-03-31 NOTE — OCCUPATIONAL THERAPY INITIAL EVALUATION ADULT - GENERAL OBSERVATIONS, REHAB EVAL
8:57-9:30; chart reviewed, ok to treat by Occupational Therapist as confirmed by JESUS Rand, Pt received semifowler +primafit in NAD. Pt denied pain at rest and in agreement with OT IE. 8:57-9:30; chart reviewed, ok to treat by Occupational Therapist as confirmed by RN Keyona, Pt received semifowler +primafit in NAD. Pt denied pain at rest and in agreement with OT IE. As discussed with Dr. Murphy, elliot Benoit manuever at this time 2/2 MRI Head (3/23) "Incidentally noted partially evaluated C3-4 disc herniation indenting the ventral cord."

## 2025-03-31 NOTE — DISCHARGE NOTE PROVIDER - NSDCCPCAREPLAN_GEN_ALL_CORE_FT
PRINCIPAL DISCHARGE DIAGNOSIS  Diagnosis: Dizziness  Assessment and Plan of Treatment: You presented with a redemonstration of dizziness and scalp pain.  We were unable to identify any acute causes of these symptoms.  However they improved with IV hydration and your symptoms are now resolved.  Going forward an electrolyte hydation drink like Pedialyte - sugar free - can help prevent dehydration and these symptoms.  It is also posible that tighter control of your sugars can help prevent dehydration.  Please follow up with your endocrinologist.   While in the hospital we identified an anemia and that you have sickle cell trait.  We are including contact information for Dr. Vergara who can help you further work up and treat these conditions if neccessary.   You were noted to have extremely high blood pressure while you were in the hospital.  We are sending you home with new blood pressure medications.  Also of note is a finding from an MRI you had 3/23 which showed a disk herniation in your neck.  We reviewed the case with the neurosurgery team and they recomended following up in their office but saw no indication for urgent surgical intervention.    Please follow up with your PCP within two weeks.   If your symptoms return or worsen please return to the hospital.

## 2025-03-31 NOTE — DISCHARGE NOTE NURSING/CASE MANAGEMENT/SOCIAL WORK - PATIENT PORTAL LINK FT
You can access the FollowMyHealth Patient Portal offered by [facility] by registering at the following website: [email or web address]. By joining [facility]’s FollowMyHealth portal, you will also be able to view your health information using other applications (apps) compatible with our system.

## 2025-03-31 NOTE — DISCHARGE NOTE PROVIDER - HOSPITAL COURSE
60yoF PMHx obesity, DM, HTN, HLD, CKD 3A, recent admission for hypertensive emergency BP on arrival 170s/70s, returning to the ED today for sudden onset severe headache, dizziness, nausea, vomiting at 3pm when she woke up. She reports she went to sleep earlier today, but does not know what time. ED spoke with patient's , who reports she was feeling headache and cold last night, This morning he saw her at about 10am before he left and she was acting fine. Then she called him at 3pm complaining of a headache again and he told her to go to the hospital. patient had similar symptoms 72 hours prior to arrival seen and evaluated at [place] site improved. She reports taking her hypertensive medications this morning. Currently, pt states HA and dizziness symptoms are improved in the ED currently laying in bed comfortably and relaxed.  denies any fevers chills diaphoresis denies difficulty walking, CP, SOB  Pt is AA+O x3    # Dizziness  # HA - further clarified as scalp tenderness, intermittent   - OT vestibular rehab  - meclizine  - topical pain relief --> helped, add dermaplast on d/c    # HTN urgency  - Code stroke called  - pt states symptoms are improved in the ED  - All radiographic reports show no acute findings  - Last admission on 3/22, pt was Seen by Neurology, CT, CTA head/neck, EEG, MRI, MRV all unremarkable  - troponin negative delta  - increased amlodipine to 10 added hctz to 12.5    # sickle cell trait  # anemia  - sickle screen positive   - Hb A 58.2 HB S 38.8  - anemia labs drawn  - f/u heme OP    # DM  - hold home meds  - FS monitoring  - ISS    # HLD  - c/w statin    # mylar rash  - f/u mona  - may need rheum f/u    Pt HD stable, motivated to return home  Understands plan, answered questions, will make f/u appointments.

## 2025-03-31 NOTE — DISCHARGE NOTE PROVIDER - CARE PROVIDER_API CALL
Luis Acuña  Internal Medicine  [address]  [place] [address]  Phone: [phone]  Fax: [phone]  Follow Up Time:     Giana Alcazar  Neurosurgery  [address], [address]  [place] [address]  Phone: [phone]  Fax: [phone]  Follow Up Time:     Theresa Trujillo  Hematology  [address] [place] [address]  Phone: [phone]  Fax: [phone]  Follow Up Time:

## 2025-03-31 NOTE — DISCHARGE NOTE PROVIDER - CARE PROVIDERS DIRECT ADDRESSES
[email or web address]

## 2025-03-31 NOTE — DISCHARGE NOTE PROVIDER - PROVIDER TOKENS
PROVIDER:[TOKEN:[[id number]]],PROVIDER:[TOKEN:[[id number]]],PROVIDER:[TOKEN:[[id number]]]

## 2025-03-31 NOTE — DISCHARGE NOTE NURSING/CASE MANAGEMENT/SOCIAL WORK - FINANCIAL ASSISTANCE
[facility] provides services at a reduced cost to those who are determined to be eligible through [facility]’s financial assistance program. Information regarding [facility]’s financial assistance program can be found by going to [email or web address] or by calling [phone].

## 2025-03-31 NOTE — OCCUPATIONAL THERAPY INITIAL EVALUATION ADULT - MANUAL MUSCLE TESTING RESULTS, REHAB EVAL
(L) shoulder and elbow 4-/5, (L) wrist and digits 3+/5; (R) shoulder and elbow 4-/5, (R) wrist and digits 3+/5

## 2025-03-31 NOTE — DISCHARGE NOTE PROVIDER - NSDCMRMEDTOKEN_GEN_ALL_CORE_FT
amLODIPine 10 mg oral tablet: 1 tab(s) orally once a day  Crestor 10 mg oral tablet: 1 tab(s) orally once a day  glimepiride 1 mg oral tablet: 2 tab(s) orally once a day  metFORMIN 1000 mg oral tablet: 1 tab(s) orally 2 times a day

## 2025-03-31 NOTE — OCCUPATIONAL THERAPY INITIAL EVALUATION ADULT - PERTINENT HX OF CURRENT PROBLEM, REHAB EVAL
Pt is a 60yoF PMHx obesity, DM, HTN, HLD, CKD 3A, recent admission for hypertensive emergency BP on arrival 170s/70s, returning to the ED today (3/27) for sudden onset severe headache, dizziness, nausea, vomiting at 3pm when she woke up. She reports she went to sleep earlier today, but does not know what time. ED spoke with patient's , who reports she was feeling headache and cold last night, This morning (3/27) he saw her at about 10am before he left and she was acting fine. Then she called him at 3pm complaining of a headache again and he told her to go to the hospital. patient had similar symptoms 72 hours prior to arrival [facility] and [facility]. She reports taking her hypertensive medications this morning. Currently, pt states HA and dizziness symptoms are improved in the ED currently laying in bed comfortably and relaxed.  denies any fevers chills diaphoresis denies difficulty walking, CP, SOB    CT Brain (3/27): No acute intracranial pathology.  CT Angio Brain (3/27): Motion limited exam. No perfusion deficits to suggest areas of completed infarction or at risk territory.  CT Angio Head/Neck (3/27): No large vessel occlusion, aneurysm, or vascular malformation. Stable focal severe stenosis in the origin of the inferior division left MCA.  MRI Brain (3/23): No acute intracranial pathology or abnormal enhancement. Incidentally noted partially evaluated C3-4 disc herniation indenting the ventral cord.  MRV Head (3/23): No evidence for dural venous sinus thrombosis or stenosis.

## 2025-03-31 NOTE — OCCUPATIONAL THERAPY INITIAL EVALUATION ADULT - LIVES WITH, PROFILE
2 adult brothers in a 2 family home +4 steps to enter with (B) handrails then level +walk-in shower +standard/other relative 2 adult brothers in a 2-family home +4 steps to enter with (B) handrails then level inside +walk-in shower +standard toilet/other relative

## 2025-04-01 ENCOUNTER — NON-APPOINTMENT (OUTPATIENT)
Age: 60
End: 2025-04-01

## 2025-04-01 LAB
ANA PAT FLD IF-IMP: ABNORMAL
ANA TITR SER: ABNORMAL
HAPTOGLOB SERPL-MCNC: 159 MG/DL — SIGNIFICANT CHANGE UP (ref 34–200)
PROT SERPL-MCNC: 6.8 G/DL — SIGNIFICANT CHANGE UP (ref 6–8.3)

## 2025-04-02 LAB
CULTURE RESULTS: SIGNIFICANT CHANGE UP
SPECIMEN SOURCE: SIGNIFICANT CHANGE UP

## 2025-04-07 LAB
% ALBUMIN: 49.2 % — SIGNIFICANT CHANGE UP
% ALPHA 1: 3.9 % — SIGNIFICANT CHANGE UP
% ALPHA 2: 10.7 % — SIGNIFICANT CHANGE UP
% BETA: 13.8 % — SIGNIFICANT CHANGE UP
% GAMMA: 22.4 % — SIGNIFICANT CHANGE UP
ALBUMIN SERPL ELPH-MCNC: 3.3 G/DL — LOW (ref 3.6–5.5)
ALBUMIN/GLOB SERPL ELPH: 0.9 RATIO — SIGNIFICANT CHANGE UP
ALPHA1 GLOB SERPL ELPH-MCNC: 0.3 G/DL — SIGNIFICANT CHANGE UP (ref 0.1–0.4)
ALPHA2 GLOB SERPL ELPH-MCNC: 0.7 G/DL — SIGNIFICANT CHANGE UP (ref 0.5–1)
B-GLOBULIN SERPL ELPH-MCNC: 0.9 G/DL — SIGNIFICANT CHANGE UP (ref 0.5–1)
GAMMA GLOBULIN: 1.5 G/DL — SIGNIFICANT CHANGE UP (ref 0.6–1.6)
INTERPRETATION SERPL IFE-IMP: SIGNIFICANT CHANGE UP
PROT PATTERN SERPL ELPH-IMP: SIGNIFICANT CHANGE UP
PROT SERPL-MCNC: 6.8 G/DL — SIGNIFICANT CHANGE UP (ref 6–8.3)

## 2025-04-08 NOTE — ED ADULT NURSE NOTE - NS ED NURSE DISCH DISPOSITION
Spiritual Care Visit  Spiritual Care Request    Reason for Visit:  Routine Visit: Introduction  Crisis Visit: ED     Request Received From:       Focus of Care:  Visited With: Patient         Refer to :          Spiritual Care Assessment    Spiritual Assessment:                      Care Provided:       Sense of Community and or Hindu Affiliation:  Islam   Values/Beliefs  Spiritual Requests During Hospitalization: Zeinab asked to be anointed but not to have any sacramens from the [employer].     Addressed Needs/Concerns and/or Princess Through:     Sacramental Encounters  Communion: Does not want communion  Communion Given Indicator: No  Sacrament of Sick-Anointing: Patient declined anointing    Outcome:        Advance Directives:         Spiritual Care Annotation    Annotation:  Zeinab asked for a blessing but no sacraments from the [employer].  Kenny Cervantes          Admitted

## 2025-04-09 DIAGNOSIS — E78.5 HYPERLIPIDEMIA, UNSPECIFIED: ICD-10-CM

## 2025-04-09 DIAGNOSIS — D57.3 SICKLE-CELL TRAIT: ICD-10-CM

## 2025-04-09 DIAGNOSIS — I12.9 HYPERTENSIVE CHRONIC KIDNEY DISEASE WITH STAGE 1 THROUGH STAGE 4 CHRONIC KIDNEY DISEASE, OR UNSPECIFIED CHRONIC KIDNEY DISEASE: ICD-10-CM

## 2025-04-09 DIAGNOSIS — R42 DIZZINESS AND GIDDINESS: ICD-10-CM

## 2025-04-09 DIAGNOSIS — E11.22 TYPE 2 DIABETES MELLITUS WITH DIABETIC CHRONIC KIDNEY DISEASE: ICD-10-CM

## 2025-04-09 DIAGNOSIS — E66.9 OBESITY, UNSPECIFIED: ICD-10-CM

## 2025-04-09 DIAGNOSIS — I16.0 HYPERTENSIVE URGENCY: ICD-10-CM

## 2025-04-09 DIAGNOSIS — M50.21 OTHER CERVICAL DISC DISPLACEMENT, HIGH CERVICAL REGION: ICD-10-CM

## 2025-06-16 ENCOUNTER — APPOINTMENT (OUTPATIENT)
Dept: INTERNAL MEDICINE | Facility: CLINIC | Age: 60
End: 2025-06-16

## 2025-09-03 ENCOUNTER — APPOINTMENT (OUTPATIENT)
Dept: GASTROENTEROLOGY | Facility: CLINIC | Age: 60
End: 2025-09-03